# Patient Record
Sex: FEMALE | Race: WHITE | NOT HISPANIC OR LATINO | Employment: OTHER | URBAN - METROPOLITAN AREA
[De-identification: names, ages, dates, MRNs, and addresses within clinical notes are randomized per-mention and may not be internally consistent; named-entity substitution may affect disease eponyms.]

---

## 2017-01-17 ENCOUNTER — COMMUNICATION - HEALTHEAST (OUTPATIENT)
Dept: FAMILY MEDICINE | Facility: CLINIC | Age: 52
End: 2017-01-17

## 2017-01-17 DIAGNOSIS — F51.01 PRIMARY INSOMNIA: ICD-10-CM

## 2017-05-31 ENCOUNTER — COMMUNICATION - HEALTHEAST (OUTPATIENT)
Dept: FAMILY MEDICINE | Facility: CLINIC | Age: 52
End: 2017-05-31

## 2017-05-31 DIAGNOSIS — F51.01 PRIMARY INSOMNIA: ICD-10-CM

## 2017-09-22 ENCOUNTER — COMMUNICATION - HEALTHEAST (OUTPATIENT)
Dept: FAMILY MEDICINE | Facility: CLINIC | Age: 52
End: 2017-09-22

## 2017-09-22 DIAGNOSIS — F51.01 PRIMARY INSOMNIA: ICD-10-CM

## 2017-10-12 ENCOUNTER — AMBULATORY - HEALTHEAST (OUTPATIENT)
Dept: NURSING | Facility: CLINIC | Age: 52
End: 2017-10-12

## 2017-10-12 DIAGNOSIS — Z23 NEED FOR IMMUNIZATION AGAINST INFLUENZA: ICD-10-CM

## 2017-12-19 ENCOUNTER — COMMUNICATION - HEALTHEAST (OUTPATIENT)
Dept: FAMILY MEDICINE | Facility: CLINIC | Age: 52
End: 2017-12-19

## 2017-12-19 DIAGNOSIS — F51.01 PRIMARY INSOMNIA: ICD-10-CM

## 2017-12-20 ENCOUNTER — COMMUNICATION - HEALTHEAST (OUTPATIENT)
Dept: FAMILY MEDICINE | Facility: CLINIC | Age: 52
End: 2017-12-20

## 2017-12-20 DIAGNOSIS — F51.01 PRIMARY INSOMNIA: ICD-10-CM

## 2018-01-12 ENCOUNTER — OFFICE VISIT - HEALTHEAST (OUTPATIENT)
Dept: NURSING | Facility: CLINIC | Age: 53
End: 2018-01-12

## 2018-01-12 ENCOUNTER — OFFICE VISIT - HEALTHEAST (OUTPATIENT)
Dept: FAMILY MEDICINE | Facility: CLINIC | Age: 53
End: 2018-01-12

## 2018-01-12 DIAGNOSIS — Z12.31 VISIT FOR SCREENING MAMMOGRAM: ICD-10-CM

## 2018-01-12 DIAGNOSIS — M54.50 LOW BACK PAIN: ICD-10-CM

## 2018-01-12 DIAGNOSIS — Z78.0 MENOPAUSE: ICD-10-CM

## 2018-01-12 DIAGNOSIS — F51.01 PRIMARY INSOMNIA: ICD-10-CM

## 2018-01-12 DIAGNOSIS — Z00.00 HEALTH CARE MAINTENANCE: ICD-10-CM

## 2018-01-12 LAB
ALBUMIN SERPL-MCNC: 3.9 G/DL (ref 3.5–5)
ALP SERPL-CCNC: 65 U/L (ref 45–120)
ALT SERPL W P-5'-P-CCNC: 28 U/L (ref 0–45)
ANION GAP SERPL CALCULATED.3IONS-SCNC: 9 MMOL/L (ref 5–18)
AST SERPL W P-5'-P-CCNC: 23 U/L (ref 0–40)
BILIRUB SERPL-MCNC: 0.5 MG/DL (ref 0–1)
BUN SERPL-MCNC: 21 MG/DL (ref 8–22)
CALCIUM SERPL-MCNC: 9.6 MG/DL (ref 8.5–10.5)
CHLORIDE BLD-SCNC: 106 MMOL/L (ref 98–107)
CO2 SERPL-SCNC: 28 MMOL/L (ref 22–31)
CREAT SERPL-MCNC: 0.83 MG/DL (ref 0.6–1.1)
ERYTHROCYTE [DISTWIDTH] IN BLOOD BY AUTOMATED COUNT: 11 % (ref 11–14.5)
GFR SERPL CREATININE-BSD FRML MDRD: >60 ML/MIN/1.73M2
GLUCOSE BLD-MCNC: 87 MG/DL (ref 70–125)
HBA1C MFR BLD: 5.2 % (ref 3.5–6.1)
HCT VFR BLD AUTO: 43 % (ref 35–47)
HGB BLD-MCNC: 14.5 G/DL (ref 12–16)
MCH RBC QN AUTO: 31.6 PG (ref 27–34)
MCHC RBC AUTO-ENTMCNC: 33.7 G/DL (ref 32–36)
MCV RBC AUTO: 94 FL (ref 80–100)
PLATELET # BLD AUTO: 305 THOU/UL (ref 140–440)
PMV BLD AUTO: 7.6 FL (ref 7–10)
POTASSIUM BLD-SCNC: 5.3 MMOL/L (ref 3.5–5)
PROT SERPL-MCNC: 6.9 G/DL (ref 6–8)
RBC # BLD AUTO: 4.58 MILL/UL (ref 3.8–5.4)
SODIUM SERPL-SCNC: 143 MMOL/L (ref 136–145)
WBC: 4.6 THOU/UL (ref 4–11)

## 2018-01-12 ASSESSMENT — MIFFLIN-ST. JEOR: SCORE: 1193.57

## 2018-01-15 LAB
25(OH)D3 SERPL-MCNC: 75.7 NG/ML (ref 30–80)
25(OH)D3 SERPL-MCNC: 75.7 NG/ML (ref 30–80)
HPV SOURCE: NORMAL
HUMAN PAPILLOMA VIRUS 16 DNA: NEGATIVE
HUMAN PAPILLOMA VIRUS 18 DNA: NEGATIVE
HUMAN PAPILLOMA VIRUS FINAL DIAGNOSIS: NORMAL
HUMAN PAPILLOMA VIRUS OTHER HR: NEGATIVE
SPECIMEN DESCRIPTION: NORMAL

## 2018-01-20 LAB
BKR LAB AP ABNORMAL BLEEDING: NO
BKR LAB AP BIRTH CONTROL/HORMONES: NORMAL
BKR LAB AP CERVICAL APPEARANCE: NORMAL
BKR LAB AP GYN ADEQUACY: NORMAL
BKR LAB AP GYN INTERPRETATION: NORMAL
BKR LAB AP HPV REFLEX: NORMAL
BKR LAB AP LMP: NORMAL
BKR LAB AP PATIENT STATUS: NORMAL
BKR LAB AP PREVIOUS ABNORMAL: NORMAL
BKR LAB AP PREVIOUS NORMAL: 2014
HIGH RISK?: NO
PATH REPORT.COMMENTS IMP SPEC: NORMAL
RESULT FLAG (HE HISTORICAL CONVERSION): NORMAL

## 2018-01-21 ENCOUNTER — AMBULATORY - HEALTHEAST (OUTPATIENT)
Dept: FAMILY MEDICINE | Facility: CLINIC | Age: 53
End: 2018-01-21

## 2018-01-21 DIAGNOSIS — E87.5 HYPERKALEMIA: ICD-10-CM

## 2018-02-09 ENCOUNTER — HOSPITAL ENCOUNTER (OUTPATIENT)
Dept: MAMMOGRAPHY | Facility: CLINIC | Age: 53
Discharge: HOME | End: 2018-02-09

## 2018-02-09 DIAGNOSIS — Z12.31 VISIT FOR SCREENING MAMMOGRAM: ICD-10-CM

## 2018-03-09 ENCOUNTER — RECORDS - HEALTHEAST (OUTPATIENT)
Dept: ADMINISTRATIVE | Facility: OTHER | Age: 53
End: 2018-03-09

## 2018-04-01 ENCOUNTER — COMMUNICATION - HEALTHEAST (OUTPATIENT)
Dept: FAMILY MEDICINE | Facility: CLINIC | Age: 53
End: 2018-04-01

## 2018-04-01 DIAGNOSIS — F41.9 ANXIETY: ICD-10-CM

## 2018-07-05 ENCOUNTER — COMMUNICATION - HEALTHEAST (OUTPATIENT)
Dept: FAMILY MEDICINE | Facility: CLINIC | Age: 53
End: 2018-07-05

## 2018-07-05 DIAGNOSIS — F51.01 PRIMARY INSOMNIA: ICD-10-CM

## 2018-08-08 ENCOUNTER — COMMUNICATION - HEALTHEAST (OUTPATIENT)
Dept: NURSING | Facility: CLINIC | Age: 53
End: 2018-08-08

## 2018-08-08 DIAGNOSIS — F41.9 ANXIETY: ICD-10-CM

## 2018-09-17 ENCOUNTER — COMMUNICATION - HEALTHEAST (OUTPATIENT)
Dept: FAMILY MEDICINE | Facility: CLINIC | Age: 53
End: 2018-09-17

## 2018-09-17 DIAGNOSIS — F51.01 PRIMARY INSOMNIA: ICD-10-CM

## 2018-12-07 ENCOUNTER — COMMUNICATION - HEALTHEAST (OUTPATIENT)
Dept: FAMILY MEDICINE | Facility: CLINIC | Age: 53
End: 2018-12-07

## 2018-12-07 DIAGNOSIS — F51.01 PRIMARY INSOMNIA: ICD-10-CM

## 2018-12-30 ENCOUNTER — COMMUNICATION - HEALTHEAST (OUTPATIENT)
Dept: FAMILY MEDICINE | Facility: CLINIC | Age: 53
End: 2018-12-30

## 2018-12-30 DIAGNOSIS — F51.01 PRIMARY INSOMNIA: ICD-10-CM

## 2019-04-11 ENCOUNTER — COMMUNICATION - HEALTHEAST (OUTPATIENT)
Dept: FAMILY MEDICINE | Facility: CLINIC | Age: 54
End: 2019-04-11

## 2019-04-11 DIAGNOSIS — F51.01 PRIMARY INSOMNIA: ICD-10-CM

## 2019-04-19 ENCOUNTER — OFFICE VISIT - HEALTHEAST (OUTPATIENT)
Dept: FAMILY MEDICINE | Facility: CLINIC | Age: 54
End: 2019-04-19

## 2019-04-19 DIAGNOSIS — Z12.31 VISIT FOR SCREENING MAMMOGRAM: ICD-10-CM

## 2019-04-19 DIAGNOSIS — F51.01 PRIMARY INSOMNIA: ICD-10-CM

## 2019-04-19 DIAGNOSIS — Z87.898 HX OF MOTION SICKNESS: ICD-10-CM

## 2019-04-19 DIAGNOSIS — F41.8 SITUATIONAL ANXIETY: ICD-10-CM

## 2019-06-01 ENCOUNTER — COMMUNICATION - HEALTHEAST (OUTPATIENT)
Dept: FAMILY MEDICINE | Facility: CLINIC | Age: 54
End: 2019-06-01

## 2019-06-01 DIAGNOSIS — F51.01 PRIMARY INSOMNIA: ICD-10-CM

## 2019-07-18 ENCOUNTER — COMMUNICATION - HEALTHEAST (OUTPATIENT)
Dept: FAMILY MEDICINE | Facility: CLINIC | Age: 54
End: 2019-07-18

## 2019-07-18 DIAGNOSIS — F51.01 PRIMARY INSOMNIA: ICD-10-CM

## 2019-11-27 ENCOUNTER — COMMUNICATION - HEALTHEAST (OUTPATIENT)
Dept: FAMILY MEDICINE | Facility: CLINIC | Age: 54
End: 2019-11-27

## 2019-11-27 DIAGNOSIS — F51.01 PRIMARY INSOMNIA: ICD-10-CM

## 2019-12-04 ENCOUNTER — OFFICE VISIT - HEALTHEAST (OUTPATIENT)
Dept: FAMILY MEDICINE | Facility: CLINIC | Age: 54
End: 2019-12-04

## 2019-12-04 DIAGNOSIS — N95.1 MENOPAUSAL SYNDROME (HOT FLASHES): ICD-10-CM

## 2019-12-04 DIAGNOSIS — R53.83 FATIGUE, UNSPECIFIED TYPE: ICD-10-CM

## 2019-12-04 DIAGNOSIS — Z13.220 SCREENING CHOLESTEROL LEVEL: ICD-10-CM

## 2019-12-04 DIAGNOSIS — F51.01 PRIMARY INSOMNIA: ICD-10-CM

## 2019-12-04 LAB
T3FREE SERPL-MCNC: 2.8 PG/ML (ref 1.9–3.9)
T4 FREE SERPL-MCNC: 0.9 NG/DL (ref 0.7–1.8)
TSH SERPL DL<=0.005 MIU/L-ACNC: 1.23 UIU/ML (ref 0.3–5)

## 2019-12-04 ASSESSMENT — MIFFLIN-ST. JEOR: SCORE: 1164.09

## 2019-12-05 LAB
25(OH)D3 SERPL-MCNC: 58.2 NG/ML (ref 30–80)
THYROID PEROXIDASE ANTIBODIES - HISTORICAL: <3 IU/ML (ref 0–5.6)

## 2019-12-06 ENCOUNTER — AMBULATORY - HEALTHEAST (OUTPATIENT)
Dept: LAB | Facility: CLINIC | Age: 54
End: 2019-12-06

## 2019-12-06 ENCOUNTER — HOSPITAL ENCOUNTER (OUTPATIENT)
Dept: MAMMOGRAPHY | Facility: CLINIC | Age: 54
Discharge: HOME | End: 2019-12-06
Attending: FAMILY MEDICINE

## 2019-12-06 DIAGNOSIS — Z12.31 VISIT FOR SCREENING MAMMOGRAM: ICD-10-CM

## 2019-12-06 DIAGNOSIS — Z13.220 SCREENING CHOLESTEROL LEVEL: ICD-10-CM

## 2019-12-06 DIAGNOSIS — R53.83 FATIGUE, UNSPECIFIED TYPE: ICD-10-CM

## 2019-12-06 LAB
ALBUMIN SERPL-MCNC: 4 G/DL (ref 3.5–5)
ALP SERPL-CCNC: 58 U/L (ref 45–120)
ALT SERPL W P-5'-P-CCNC: 16 U/L (ref 0–45)
ANION GAP SERPL CALCULATED.3IONS-SCNC: 7 MMOL/L (ref 5–18)
AST SERPL W P-5'-P-CCNC: 21 U/L (ref 0–40)
BILIRUB SERPL-MCNC: 0.4 MG/DL (ref 0–1)
BUN SERPL-MCNC: 20 MG/DL (ref 8–22)
CALCIUM SERPL-MCNC: 9.6 MG/DL (ref 8.5–10.5)
CHLORIDE BLD-SCNC: 105 MMOL/L (ref 98–107)
CHOLEST SERPL-MCNC: 205 MG/DL
CO2 SERPL-SCNC: 31 MMOL/L (ref 22–31)
CREAT SERPL-MCNC: 0.87 MG/DL (ref 0.6–1.1)
ERYTHROCYTE [DISTWIDTH] IN BLOOD BY AUTOMATED COUNT: 10.9 % (ref 11–14.5)
ESTRADIOL SERPL-MCNC: <10 PG/ML
FASTING STATUS PATIENT QL REPORTED: YES
FSH SERPL-ACNC: 109.4 MIU/ML
GFR SERPL CREATININE-BSD FRML MDRD: >60 ML/MIN/1.73M2
GLUCOSE BLD-MCNC: 95 MG/DL (ref 70–125)
HCT VFR BLD AUTO: 43.6 % (ref 35–47)
HDLC SERPL-MCNC: 72 MG/DL
HGB BLD-MCNC: 14.6 G/DL (ref 12–16)
LDLC SERPL CALC-MCNC: 118 MG/DL
LH SERPL-ACNC: 51.7 MIU/ML
MCH RBC QN AUTO: 32 PG (ref 27–34)
MCHC RBC AUTO-ENTMCNC: 33.5 G/DL (ref 32–36)
MCV RBC AUTO: 96 FL (ref 80–100)
PLATELET # BLD AUTO: 248 THOU/UL (ref 140–440)
PMV BLD AUTO: 7.7 FL (ref 7–10)
POTASSIUM BLD-SCNC: 4.9 MMOL/L (ref 3.5–5)
PROGEST SERPL-MCNC: <0.1 NG/ML
PROT SERPL-MCNC: 6.3 G/DL (ref 6–8)
RBC # BLD AUTO: 4.56 MILL/UL (ref 3.8–5.4)
SODIUM SERPL-SCNC: 143 MMOL/L (ref 136–145)
TRIGL SERPL-MCNC: 76 MG/DL
WBC: 3.8 THOU/UL (ref 4–11)

## 2019-12-07 LAB — DHEA-S SERPL-MCNC: 60 UG/DL (ref 26–200)

## 2019-12-09 ENCOUNTER — COMMUNICATION - HEALTHEAST (OUTPATIENT)
Dept: FAMILY MEDICINE | Facility: CLINIC | Age: 54
End: 2019-12-09

## 2019-12-10 LAB
SHBG SERPL-SCNC: 65 NMOL/L (ref 30–135)
TESTOST FREE SERPL-MCNC: 0.07 NG/DL (ref 0.11–0.58)
TESTOST SERPL-MCNC: 7 NG/DL (ref 8–60)

## 2019-12-18 ENCOUNTER — OFFICE VISIT - HEALTHEAST (OUTPATIENT)
Dept: FAMILY MEDICINE | Facility: CLINIC | Age: 54
End: 2019-12-18

## 2019-12-18 DIAGNOSIS — R53.82 CHRONIC FATIGUE: ICD-10-CM

## 2019-12-18 DIAGNOSIS — R79.89 LOW SERUM PROGESTERONE: ICD-10-CM

## 2019-12-18 ASSESSMENT — MIFFLIN-ST. JEOR: SCORE: 1163.8

## 2020-01-15 ENCOUNTER — COMMUNICATION - HEALTHEAST (OUTPATIENT)
Dept: FAMILY MEDICINE | Facility: CLINIC | Age: 55
End: 2020-01-15

## 2020-01-15 DIAGNOSIS — R79.89 LOW SERUM PROGESTERONE: ICD-10-CM

## 2020-04-16 ENCOUNTER — COMMUNICATION - HEALTHEAST (OUTPATIENT)
Dept: FAMILY MEDICINE | Facility: CLINIC | Age: 55
End: 2020-04-16

## 2020-04-16 DIAGNOSIS — R79.89 LOW SERUM PROGESTERONE: ICD-10-CM

## 2020-06-10 ENCOUNTER — OFFICE VISIT - HEALTHEAST (OUTPATIENT)
Dept: FAMILY MEDICINE | Facility: CLINIC | Age: 55
End: 2020-06-10

## 2020-06-10 ENCOUNTER — RECORDS - HEALTHEAST (OUTPATIENT)
Dept: GENERAL RADIOLOGY | Facility: CLINIC | Age: 55
End: 2020-06-10

## 2020-06-10 DIAGNOSIS — S99.912A ANKLE INJURY, LEFT, INITIAL ENCOUNTER: ICD-10-CM

## 2020-06-10 DIAGNOSIS — S99.912A UNSPECIFIED INJURY OF LEFT ANKLE, INITIAL ENCOUNTER: ICD-10-CM

## 2020-06-10 DIAGNOSIS — R53.82 CHRONIC FATIGUE: ICD-10-CM

## 2020-06-10 DIAGNOSIS — Z79.890 POSTMENOPAUSAL HORMONE REPLACEMENT THERAPY: ICD-10-CM

## 2020-06-29 ENCOUNTER — COMMUNICATION - HEALTHEAST (OUTPATIENT)
Dept: FAMILY MEDICINE | Facility: CLINIC | Age: 55
End: 2020-06-29

## 2020-07-13 ENCOUNTER — COMMUNICATION - HEALTHEAST (OUTPATIENT)
Dept: FAMILY MEDICINE | Facility: CLINIC | Age: 55
End: 2020-07-13

## 2020-07-27 ENCOUNTER — COMMUNICATION - HEALTHEAST (OUTPATIENT)
Dept: FAMILY MEDICINE | Facility: CLINIC | Age: 55
End: 2020-07-27

## 2020-08-18 ENCOUNTER — COMMUNICATION - HEALTHEAST (OUTPATIENT)
Dept: FAMILY MEDICINE | Facility: CLINIC | Age: 55
End: 2020-08-18

## 2020-08-31 ENCOUNTER — COMMUNICATION - HEALTHEAST (OUTPATIENT)
Dept: FAMILY MEDICINE | Facility: CLINIC | Age: 55
End: 2020-08-31

## 2020-08-31 DIAGNOSIS — Z79.890 POSTMENOPAUSAL HORMONE REPLACEMENT THERAPY: ICD-10-CM

## 2020-09-01 ENCOUNTER — COMMUNICATION - HEALTHEAST (OUTPATIENT)
Dept: FAMILY MEDICINE | Facility: CLINIC | Age: 55
End: 2020-09-01

## 2020-09-01 DIAGNOSIS — Z79.890 POSTMENOPAUSAL HORMONE REPLACEMENT THERAPY: ICD-10-CM

## 2020-10-12 ENCOUNTER — COMMUNICATION - HEALTHEAST (OUTPATIENT)
Dept: FAMILY MEDICINE | Facility: CLINIC | Age: 55
End: 2020-10-12

## 2020-10-12 DIAGNOSIS — L98.9 SKIN LESION: ICD-10-CM

## 2020-11-07 ENCOUNTER — COMMUNICATION - HEALTHEAST (OUTPATIENT)
Dept: FAMILY MEDICINE | Facility: CLINIC | Age: 55
End: 2020-11-07

## 2020-12-01 ENCOUNTER — RECORDS - HEALTHEAST (OUTPATIENT)
Dept: ADMINISTRATIVE | Facility: OTHER | Age: 55
End: 2020-12-01

## 2020-12-10 ENCOUNTER — COMMUNICATION - HEALTHEAST (OUTPATIENT)
Dept: FAMILY MEDICINE | Facility: CLINIC | Age: 55
End: 2020-12-10

## 2020-12-10 DIAGNOSIS — Z79.890 POSTMENOPAUSAL HORMONE REPLACEMENT THERAPY: ICD-10-CM

## 2020-12-10 DIAGNOSIS — F51.01 PRIMARY INSOMNIA: ICD-10-CM

## 2020-12-21 ENCOUNTER — OFFICE VISIT - HEALTHEAST (OUTPATIENT)
Dept: FAMILY MEDICINE | Facility: CLINIC | Age: 55
End: 2020-12-21

## 2020-12-21 DIAGNOSIS — N95.1 MENOPAUSAL SYNDROME (HOT FLASHES): ICD-10-CM

## 2020-12-21 DIAGNOSIS — Z79.890 POSTMENOPAUSAL HORMONE REPLACEMENT THERAPY: ICD-10-CM

## 2020-12-21 DIAGNOSIS — F51.01 PRIMARY INSOMNIA: ICD-10-CM

## 2020-12-21 RX ORDER — PAROXETINE 20 MG/1
TABLET, FILM COATED ORAL
Qty: 90 TABLET | Refills: 3 | Status: SHIPPED | OUTPATIENT
Start: 2020-12-21 | End: 2022-01-21

## 2020-12-21 RX ORDER — ESTRADIOL 1 MG/1
1 TABLET ORAL DAILY
Qty: 90 TABLET | Refills: 3 | Status: SHIPPED | OUTPATIENT
Start: 2020-12-21 | End: 2022-01-17

## 2020-12-21 RX ORDER — GABAPENTIN 300 MG/1
300 CAPSULE ORAL AT BEDTIME
Qty: 90 CAPSULE | Refills: 3 | Status: SHIPPED | OUTPATIENT
Start: 2020-12-21 | End: 2022-09-13

## 2020-12-21 RX ORDER — TRAZODONE HYDROCHLORIDE 100 MG/1
TABLET ORAL
Qty: 90 TABLET | Refills: 3 | Status: SHIPPED | OUTPATIENT
Start: 2020-12-21 | End: 2022-01-17

## 2020-12-28 ENCOUNTER — RECORDS - HEALTHEAST (OUTPATIENT)
Dept: ADMINISTRATIVE | Facility: OTHER | Age: 55
End: 2020-12-28

## 2021-05-25 ENCOUNTER — RECORDS - HEALTHEAST (OUTPATIENT)
Dept: ADMINISTRATIVE | Facility: CLINIC | Age: 56
End: 2021-05-25

## 2021-05-26 ENCOUNTER — RECORDS - HEALTHEAST (OUTPATIENT)
Dept: ADMINISTRATIVE | Facility: CLINIC | Age: 56
End: 2021-05-26

## 2021-05-27 ENCOUNTER — RECORDS - HEALTHEAST (OUTPATIENT)
Dept: ADMINISTRATIVE | Facility: CLINIC | Age: 56
End: 2021-05-27

## 2021-05-27 NOTE — TELEPHONE ENCOUNTER
Called pt and informed of message. Pt would like to Est Care with another provider but is not sure who and states now is not a good time.  She will look into this more and call back to schedule. She is aware that we would not be able to refill script for her until she is seen.

## 2021-05-27 NOTE — TELEPHONE ENCOUNTER
RN cannot approve Refill Request    RN can NOT refill this medication PCP messaged that patient is overdue for Office Visit.     Last office visit: 9/2/2016 Sonya Richard NP Last Physical: 1/12/2018 Last MTM visit: Visit date not found Last visit same specialty: 9/2/2016 Sonya Richard NP.  Next visit within 3 mo: Visit date not found  Next physical within 3 mo: Visit date not found      Xiomara Cardenas, [facility] Triage/Med Refill 4/11/2019    Requested Prescriptions   Pending Prescriptions Disp Refills     traZODone (DESYREL) 150 MG tablet [Pharmacy Med Name: TRAZODONE 150 MG TABLET] 90 tablet 1     Sig: TAKE 1 TABLET BY MOUTH AT BEDTIME WITH 50 MG TABLET FOR A TOTAL  MG.       Tricyclics/Misc Antidepressant/Antianxiety Meds Refill Protocol Failed - 4/11/2019  1:16 AM        Failed - PCP or prescribing provider visit in last year     Last office visit with prescriber/PCP: 9/2/2016 Sonya Richard NP OR same dept: Visit date not found OR same specialty: 9/2/2016 Sonya Richard NP  Last physical: 1/12/2018 Last MTM visit: Visit date not found   Next visit within 3 mo: Visit date not found  Next physical within 3 mo: Visit date not found  Prescriber OR PCP: Sonya Richard NP  Last diagnosis associated with med order: 1. Primary insomnia  - traZODone (DESYREL) 150 MG tablet [Pharmacy Med Name: TRAZODONE 150 MG TABLET]; TAKE 1 TABLET BY MOUTH AT BEDTIME WITH 50 MG TABLET FOR A TOTAL  MG.  Dispense: 90 tablet; Refill: 1    If protocol passes may refill for 12 months if within 3 months of last provider visit (or a total of 15 months).
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
## 2021-05-29 NOTE — TELEPHONE ENCOUNTER
Former patient of Sonya Richard NP & has not established care with another provider.  Please assign refill request to covering provider per Clinic standard process.    Refill Approved    Rx renewed per Medication Renewal Policy. Medication was last renewed on 12/7/2018 with 1 refill.  Last office visit:     Meredith Frias, [facility] Triage/Med Refill 6/2/2019     Requested Prescriptions   Pending Prescriptions Disp Refills     PARoxetine (PAXIL) 20 MG tablet [Pharmacy Med Name: PAROXETINE HCL 20 MG TABLET] 90 tablet 1     Sig: TAKE 1 TABLET BY MOUTH EVERY DAY IN THE MORNING       [facility] Refill Protocol  Passed - 6/1/2019 12:15 AM        Passed - PCP or prescribing provider visit in last year     Last office visit with prescriber/PCP: 9/2/2016 Sonya Richard NP OR same dept: 4/19/2019 Julianne Tyler MD OR same specialty: 4/19/2019 Julianne Tyler MD  Last physical: 1/12/2018 Last MTM visit: Visit date not found   Next visit within 3 mo: Visit date not found  Next physical within 3 mo: Visit date not found  Prescriber OR PCP: Sonya Richard NP  Last diagnosis associated with med order: 1. Primary insomnia  - PARoxetine (PAXIL) 20 MG tablet [Pharmacy Med Name: PAROXETINE HCL 20 MG TABLET]; TAKE 1 TABLET BY MOUTH EVERY DAY IN THE MORNING  Dispense: 90 tablet; Refill: 1    If protocol passes may refill for 12 months if within 3 months of last provider visit (or a total of 15 months).

## 2021-05-30 NOTE — TELEPHONE ENCOUNTER
Former patient of Jayson & has not established care with another provider.  Please assign refill request to covering provider per Clinic standard process.    Refill Approved    Rx renewed per Medication Renewal Policy. Medication was last renewed on 4/19/19.    Fidelia Atkinson, [facility] Triage/Med Refill 7/18/2019     Requested Prescriptions   Pending Prescriptions Disp Refills     traZODone (DESYREL) 150 MG tablet [Pharmacy Med Name: TRAZODONE 150 MG TABLET] 90 tablet 0     Sig: TAKE 1 TABLET BY MOUTH EVERY DAY       Tricyclics/Misc Antidepressant/Antianxiety Meds Refill Protocol Passed - 7/18/2019  1:19 AM        Passed - PCP or prescribing provider visit in last year     Last office visit with prescriber/PCP: 4/19/2019 Julianne Tyler MD OR same dept: 4/19/2019 Julianne Tyler MD OR same specialty: 4/19/2019 Julianne Tyler MD  Last physical: Visit date not found Last MTM visit: Visit date not found   Next visit within 3 mo: Visit date not found  Next physical within 3 mo: Visit date not found  Prescriber OR PCP: Julianne yTler MD  Last diagnosis associated with med order: 1. Primary insomnia  - traZODone (DESYREL) 150 MG tablet [Pharmacy Med Name: TRAZODONE 150 MG TABLET]; TAKE 1 TABLET BY MOUTH EVERY DAY  Dispense: 90 tablet; Refill: 0    If protocol passes may refill for 12 months if within 3 months of last provider visit (or a total of 15 months).

## 2021-05-30 NOTE — TELEPHONE ENCOUNTER
Refill request for medication: 7/18/19  Last visit addressing this medication: 4/19/19  Follow up plan Follow up: for annual exam or to establish care for ongoing rx management    Last refill on 4/19/19, quantity #90   CSA completed not done at time of last visit    checked  07/18/19, last dispensed refill 4/19/19    Appointment: Patient will call back to schedule appointment - pt wondering if she can have Dr. Tyler listed as PCP as she felt she was a great fit after 4/19/19 appointment. - wondering if she still needs an  Establish care visit.     Also pt has weaned herself down to 100mg and is requesting 50mg tablets so she can keep decreasing dose.    Minerva Cheney, MARBINA

## 2021-05-31 VITALS — BODY MASS INDEX: 21.59 KG/M2 | WEIGHT: 129.56 LBS | HEIGHT: 65 IN

## 2021-06-03 VITALS — WEIGHT: 115.4 LBS | BODY MASS INDEX: 19.2 KG/M2

## 2021-06-03 NOTE — TELEPHONE ENCOUNTER
Last seen as refilled by Dr. Tyler.    Refill request for medication: PARoxetine (PAXIL) 20 MG tablet  Last visit addressing this medication: 4/19/19  Follow up plan Follow up: for annual exam or to establish care for ongoing rx management    Last refill on 6/3/19, quantity #90     Appointment: Appointment scheduled for 12/4/19 med check with Dr. Carmen Thompson MA

## 2021-06-03 NOTE — TELEPHONE ENCOUNTER
Former patient of Sonya Richard & has not established care with another provider.  Please assign refill request to covering provider per Clinic standard process.    Dr Tyler did last OV and refill on 4/19/2019  Emily Bansal RN, BAN, Nurse Advisor, [place] 11p-7a

## 2021-06-04 VITALS
WEIGHT: 123.06 LBS | DIASTOLIC BLOOD PRESSURE: 60 MMHG | HEIGHT: 65 IN | BODY MASS INDEX: 20.5 KG/M2 | SYSTOLIC BLOOD PRESSURE: 100 MMHG | HEART RATE: 60 BPM

## 2021-06-04 VITALS
HEIGHT: 65 IN | BODY MASS INDEX: 20.49 KG/M2 | HEART RATE: 60 BPM | WEIGHT: 123 LBS | SYSTOLIC BLOOD PRESSURE: 100 MMHG | DIASTOLIC BLOOD PRESSURE: 68 MMHG

## 2021-06-04 VITALS
HEART RATE: 71 BPM | SYSTOLIC BLOOD PRESSURE: 106 MMHG | WEIGHT: 118.13 LBS | BODY MASS INDEX: 19.66 KG/M2 | DIASTOLIC BLOOD PRESSURE: 55 MMHG

## 2021-06-04 NOTE — PROGRESS NOTES
ASSESSMENT/PLAN:  This is a pleasant 54-year-old postmenopausal female with chronic fatigue, low libido, insomnia, anxiety, hot flashes presented today to discuss her recent lab results for the above-mentioned symptoms.  Her lab results showed low testosterone, estrogen, and progesterone.  We had a lengthy discussion regarding optimization of her hormones especially that of bioidentical hormones.  We also spoke about optimal nutrition, physical activity, and self-care.  We will start her on progesterone which I believe will help with sleep whereby it may be synergistic to trazodone and even potentially wean herself off trazodone.  Natural progesterone also protects against uterine and breast carcinoma, osteoporosis, fibrocystic disease, ovarian cyst, and coronary artery disease.  We will trial her on bioidentical progesterone first then consider bioidentical testosterone and estrogen.  Benefits and side effects reviewed with the patient.  I recommend a repeat hormone lab levels in 1 to 3 months.  She verbalized understanding and agreed with the plan    Chronic fatigue, low libido, insomnia, anxiety, hot flashes  Low serum progesterone  -     progesterone (PROMETRIUM) 100 MG capsule; Take 1 capsule (100 mg total) by mouth daily.  Low testosterone  Low estrogen    CHIEF COMPLAINT:  Chief Complaint   Patient presents with     discuss lab results     12/6/19     HISTORY OF PRESENT ILLNESS:  Soraida is a 54 y.o. female presenting to the clinic today to discuss her lab results from December 6th regarding fatigue. She presented to the clinic on 12/04 with complaints of fatigue. She returned to the clinic on 12/06 for fasting blood work. Today, she mentioned that she gets headaches very easily after eating sugar. She will also get lower back pain and wrist pain along with the headaches. She has never had any food sensitivities in the past. The paxil has been able to relieve her night sweats/hot flashes. She has noticed that  "her sexual drive has been lower than normal lately. She notes that she had a cold intermittently for the last month. Her last period was about a year and a half ago. She inquires about her lab results from the 6th. She request to walk through the labs and their meaning.      REVIEW OF SYSTEMS:   Constitutional: Negative.   HENT: Negative.   Eyes: Negative.   Respiratory: Negative.   Cardiovascular: Negative.   Gastrointestinal: Negative.   Endocrine: Negative.   Genitourinary: Negative.   Musculoskeletal: Negative.   Skin: Negative.   Allergic/Immunologic: Negative.   Neurological: Negative.   Hematological: Negative.   Psychiatric/Behavioral: Negative.   All other systems are negative.    PFSH:  She has a family history of cholesterol. She has four children.     TOBACCO USE:  Social History     Tobacco Use   Smoking Status Never Smoker   Smokeless Tobacco Never Used       VITALS:  Vitals:    12/18/19 1428   BP: 100/68   Patient Site: Right Arm   Patient Position: Sitting   Cuff Size: Adult Regular   Pulse: 60   Weight: 123 lb (55.8 kg)   Height: 5' 5\" (1.651 m)     Wt Readings from Last 3 Encounters:   12/18/19 123 lb (55.8 kg)   12/04/19 123 lb 1 oz (55.8 kg)   04/19/19 115 lb 6.4 oz (52.3 kg)       PHYSICAL EXAM:  Constitutional: Patient is oriented to person, place, and time. Patient appears well-developed and well-nourished. No distress.   Head: Normocephalic and atraumatic.   Right Ear: External ear normal.   Left Ear: External ear normal.   Nose: Nose normal.   Eyes: Conjunctivae and EOM are normal. Right eye exhibits no discharge. Left eye exhibits no discharge. No scleral icterus.   Neurological: Patient is alert and oriented to person, place, and time. No cranial nerve deficit. Coordination normal.   Skin: No rash noted. Patient is not diaphoretic. No erythema. No pallor.    ADDITIONAL HISTORY SUMMARIZED (2): None.  DECISION TO OBTAIN EXTRA INFORMATION (1): None.   RADIOLOGY TESTS (1): None.  LABS (1): " Reviewed labs from 12/06/19.   MEDICINE TESTS (1): None.  INDEPENDENT REVIEW (2 each): None.     The visit lasted a total of 27 minutes face to face with the patient. Over 50% of the time was spent counseling and educating the patient about hormone levels and fatigue.    IKathryn, am scribing for and in the presence of, Dr. Morales.    I, Dr. Morales, personally performed the services described in this documentation, as scribed by Kathryn Juarez in my presence, and it is both accurate and complete.    MEDICATIONS:  Current Outpatient Medications   Medication Sig Dispense Refill     PARoxetine (PAXIL) 20 MG tablet TAKE 1 TABLET BY MOUTH EVERY DAY IN THE MORNING 90 tablet 3     traZODone (DESYREL) 100 MG tablet TAKE 1 TABLET BY MOUTH EVERY DAY 90 tablet 3     progesterone (PROMETRIUM) 100 MG capsule Take 1 capsule (100 mg total) by mouth daily. 30 capsule 0     No current facility-administered medications for this visit.        Total data points:1

## 2021-06-04 NOTE — PROGRESS NOTES
ASSESSMENT/PLAN:  Menopausal syndrome (hot flashes)  refilled  -     PARoxetine (PAXIL) 20 MG tablet; TAKE 1 TABLET BY MOUTH EVERY DAY IN THE MORNING    Primary insomnia  refilled  -     traZODone (DESYREL) 100 MG tablet; TAKE 1 TABLET BY MOUTH EVERY DAY    Fatigue, unspecified type  Described as low energy  Sleeping well on trazodone 100mg  paxil for hot flashes  No depression/anxiety  -     Vitamin D, Total (25-Hydroxy)  -     HM2(CBC w/o Differential)  -     Dehydroepiandrosterone Sulfate, Serum (DHEAS); Future  -     Estradiol; Future  -     Luteinizing Hormone (LH); Future  -     Progesterone; Future  -     Follicle Stimulating Hormone (FSH); Future  -     Testosterone, Free and Total (BTEST); Future  -     Comprehensive Metabolic Panel; Future  -     Thyroid Stimulating Hormone (TSH)  -     T4, Free  -     T3 (Triiodothyronine), Free  -     Thyroid Peroxidase Antibody    Screening cholesterol level  -     Lipid Cascade; Future    Orders Placed This Encounter   Procedures     Vitamin D, Total (25-Hydroxy)     HM2(CBC w/o Differential)     Dehydroepiandrosterone Sulfate, Serum (DHEAS)     Standing Status:   Future     Standing Expiration Date:   12/4/2020     Estradiol     Standing Status:   Future     Standing Expiration Date:   12/4/2020     Luteinizing Hormone (LH)     Standing Status:   Future     Standing Expiration Date:   12/4/2020     Progesterone     Standing Status:   Future     Standing Expiration Date:   12/4/2020     Follicle Stimulating Hormone (FSH)     Standing Status:   Future     Standing Expiration Date:   12/4/2020     Testosterone, Free and Total (BTEST)     Standing Status:   Future     Standing Expiration Date:   12/4/2020     Lipid Cascade     Standing Status:   Future     Standing Expiration Date:   12/4/2020     Order Specific Question:   Fasting is required?     Answer:   Yes     Comprehensive Metabolic Panel     Standing Status:   Future     Standing Expiration Date:   12/4/2020      "Thyroid Stimulating Hormone (TSH)     T4, Free     T3 (Triiodothyronine), Free     Thyroid Peroxidase Antibody     CHIEF COMPLAINT:  Chief Complaint   Patient presents with     med check     Medication Refill     low energy     wants to check thyroid and Vit D       HISTORY OF PRESENT ILLNESS:  Soraida is a 53 y.o. female presenting to the clinic today for an insomnia and night sweat follow up along with low energy.    Excessive Sweating: She has been taking paroxetine 20 mg for at least 3 years for night sweats. The night sweats began in her early 40's. It has been working well for her. She was not taking it for depression or anxiety.     Insomnia: She has been taking trazodone 100 mg once at bedtime for her insomnia. The insomnia is well managed by the medication. She is not concerned about her insomnia today.     Low Energy: For the last 6 months, she has felt very low energy. She is not tired and thinks that she sleeps well with the trazodone. However, she still feels a little \"blah.\" She does not feel depressed. She has been post-menopausal for about a year. Her  does not complain that she snores or stops breathing. She has been taking a [id number] units of vitamin D once a day. She does not have a history of low vitamin D.     REVIEW OF SYSTEMS:   Constitutional: Negative.   HENT: Negative.   Eyes: Negative.   Respiratory: Negative.   Cardiovascular: Negative.   Gastrointestinal: Negative.   Endocrine: Negative.   Genitourinary: Negative.   Musculoskeletal: Negative.   Skin: Negative.   Allergic/Immunologic: Negative.   Neurological: Negative.   Hematological: Negative.   Psychiatric/Behavioral: Negative.   All other systems are negative.    PFSH:  She has four children. She had them each 3 years a part. Her oldest is 26 years old and the youngest is 16 years old. Her maternal side of the family all has diabetes. She did not have gestational diabetes. Her daughter had non-cancerous nodules over her " "thyroid. Her 's family has thyroid problems.     TOBACCO USE:  Social History     Tobacco Use   Smoking Status Never Smoker   Smokeless Tobacco Never Used       VITALS:  Vitals:    12/04/19 1319   BP: 100/60   Patient Site: Left Arm   Patient Position: Sitting   Cuff Size: Adult Regular   Pulse: 60   Weight: 123 lb 1 oz (55.8 kg)   Height: 5' 5\" (1.651 m)     Wt Readings from Last 3 Encounters:   12/04/19 123 lb 1 oz (55.8 kg)   04/19/19 115 lb 6.4 oz (52.3 kg)   01/12/18 129 lb 9 oz (58.8 kg)       PHYSICAL EXAM:  Constitutional: Patient is oriented to person, place, and time. Patient appears well-developed and well-nourished. No distress.   Head: Normocephalic and atraumatic.   Right Ear: External ear normal.   Left Ear: External ear normal.   Nose: Nose normal.   Eyes: Conjunctivae and EOM are normal. Right eye exhibits no discharge. Left eye exhibits no discharge. No scleral icterus.   Neurological: Patient is alert and oriented to person, place, and time. No cranial nerve deficit. Coordination normal.   Skin: No rash noted. Patient is not diaphoretic. No erythema. No pallor.    ADDITIONAL HISTORY SUMMARIZED (2): None.  DECISION TO OBTAIN EXTRA INFORMATION (1): None.   RADIOLOGY TESTS (1): None.  LABS (1): Reviewed labs from 1/12/18 and ordered labs today.   MEDICINE TESTS (1): None.  INDEPENDENT REVIEW (2 each): None.     IKathryn, am scribing for and in the presence of, Dr. Morales.    IDr. Morales, personally performed the services described in this documentation, as scribed by Kathryn Juarez in my presence, and it is both accurate and complete.    MEDICATIONS:  Current Outpatient Medications   Medication Sig Dispense Refill     PARoxetine (PAXIL) 20 MG tablet TAKE 1 TABLET BY MOUTH EVERY DAY IN THE MORNING 90 tablet 3     traZODone (DESYREL) 100 MG tablet TAKE 1 TABLET BY MOUTH EVERY DAY 90 tablet 3     No current facility-administered medications for this visit.        Total data " points:1

## 2021-06-05 NOTE — TELEPHONE ENCOUNTER
Refill Approved    Rx renewed per Medication Renewal Policy. Medication was last renewed on 12/18/19.    Fidelia Atkinson, [facility] Triage/Med Refill 1/15/2020     Requested Prescriptions   Pending Prescriptions Disp Refills     progesterone (PROMETRIUM) 100 MG capsule 90 capsule 3     Sig: Take 1 capsule (100 mg total) by mouth daily.       Hormone Replacement Therapy Refill Protocol Passed - 1/15/2020  1:00 PM        Passed - PCP or prescribing provider visit in past 12 months       Last office visit with prescriber/PCP: 12/18/2019 Lorenzo Bello MD OR same dept: 12/18/2019 Lorenzo Bello MD OR same specialty: 12/18/2019 Lorenzo Bello MD  Last physical: Visit date not found Last MTM visit: Visit date not found     Next visit within 3 mo: Visit date not found  Next physical within 3 mo: Visit date not found  Prescriber OR PCP: Lorenzo Díaz MD  Last diagnosis associated with med order: 1. Low serum progesterone  - progesterone (PROMETRIUM) 100 MG capsule; Take 1 capsule (100 mg total) by mouth daily.  Dispense: 90 capsule; Refill: 3     If protocol passes may refill for 3 months.

## 2021-06-05 NOTE — TELEPHONE ENCOUNTER
FYI - Status Update    Who is Calling:   Patient     Update:  patient calling with update on postmenopausal symptoms.    States the medication progesterone (PROMETRIUM) 100 MG capsule is helping a lot and question if PCP has any other plans in the works or continue current treatment plan?    Okay to leave a detailed message?:  Yes

## 2021-06-07 NOTE — TELEPHONE ENCOUNTER
Refill Approved    Rx renewed per Medication Renewal Policy. Medication was last renewed on 1/15/20.    Fidelia Atkinson, [facility] Triage/Med Refill 4/16/2020     Requested Prescriptions   Pending Prescriptions Disp Refills     progesterone (PROMETRIUM) 100 MG capsule [Pharmacy Med Name: PROGESTERONE 100 MG CAPSULE] 90 capsule 0     Sig: TAKE 1 CAPSULE BY MOUTH EVERY DAY       Hormone Replacement Therapy Refill Protocol Passed - 4/16/2020  4:31 PM        Passed - PCP or prescribing provider visit in past 12 months       Last office visit with prescriber/PCP: 12/18/2019 Lorenzo Bello MD OR same dept: 12/18/2019 Lorenzo Bello MD OR same specialty: 12/18/2019 Lorenzo Bello MD  Last physical: Visit date not found Last MTM visit: Visit date not found     Next visit within 3 mo: Visit date not found  Next physical within 3 mo: Visit date not found  Prescriber OR PCP: Lorenzo Díaz MD  Last diagnosis associated with med order: 1. Low serum progesterone  - progesterone (PROMETRIUM) 100 MG capsule [Pharmacy Med Name: PROGESTERONE 100 MG CAPSULE]; TAKE 1 CAPSULE BY MOUTH EVERY DAY  Dispense: 90 capsule; Refill: 0     If protocol passes may refill for 3 months.

## 2021-06-07 NOTE — TELEPHONE ENCOUNTER
Refill Request  Did you contact pharmacy: Yes  Medication name:   Requested Prescriptions     Pending Prescriptions Disp Refills     progesterone (PROMETRIUM) 100 MG capsule [Pharmacy Med Name: PROGESTERONE 100 MG CAPSULE] 90 capsule 0     Sig: TAKE 1 CAPSULE BY MOUTH EVERY DAY     Who prescribed the medication: Lorenzo Bello MD  Requested Pharmacy: [facility]  Is patient out of medication: No.  3 days left  Patient notified refills processed in 3 business days:  yes  Okay to leave a detailed message: no
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
## 2021-06-09 NOTE — TELEPHONE ENCOUNTER
I don't have MRI result that orthopedic ordered. Please inform the patient  As for her hormones, please see me in 3 months

## 2021-06-09 NOTE — TELEPHONE ENCOUNTER
Spoke to patient.  Vaginal spotting can be a side effect of estradiol.  Keep up with all her meds.  If still with spotting in 2 wks then call & will consider vikas

## 2021-06-10 NOTE — TELEPHONE ENCOUNTER
Spotting for 5-6 wks since starting estradiol.  Also with bloatiness and HA  Plan: stop estradiol  Everything else stays the same  Update in 2 wks

## 2021-06-11 NOTE — TELEPHONE ENCOUNTER
Refill Approved    Rx renewed per Medication Renewal Policy. Medication was last renewed on 6/10/2020.    Linda Villalobos, [facility] Triage/Med Refill 9/2/2020     Requested Prescriptions   Pending Prescriptions Disp Refills     progesterone (PROMETRIUM) 200 MG capsule [Pharmacy Med Name: PROGESTERONE 200 MG CAPSULE] 90 capsule 0     Sig: TAKE 1 CAPSULE (200 MG TOTAL) BY MOUTH AT BEDTIME.       Hormone Replacement Therapy Refill Protocol Passed - 8/31/2020 12:16 AM        Passed - PCP or prescribing provider visit in past 12 months       Last office visit with prescriber/PCP: 6/10/2020 Lorenzo Bello MD OR same dept: 6/10/2020 Lorenzo Bello MD OR same specialty: 6/10/2020 Lorenzo Bello MD  Last physical: Visit date not found Last MTM visit: Visit date not found     Next visit within 3 mo: Visit date not found  Next physical within 3 mo: Visit date not found  Prescriber OR PCP: Lorenzo Díaz MD  Last diagnosis associated with med order: 1. Postmenopausal hormone replacement therapy  - progesterone (PROMETRIUM) 200 MG capsule [Pharmacy Med Name: PROGESTERONE 200 MG CAPSULE]; Take 1 capsule (200 mg total) by mouth at bedtime.  Dispense: 90 capsule; Refill: 0     If protocol passes may refill for 3 months.

## 2021-06-13 NOTE — PROGRESS NOTES
"Soraida Perkins is a 55 y.o. female who is being evaluated via a billable video visit.      The patient has been notified of following:     \"This video visit will be conducted via a call between you and your physician/provider. We have found that certain health care needs can be provided without the need for an in-person physical exam.  This service lets us provide the care you need with a video conversation.  If a prescription is necessary we can send it directly to your pharmacy.  If lab work is needed we can place an order for that and you can then stop by our lab to have the test done at a later time.    Video visits are billed at different rates depending on your insurance coverage. Please reach out to your insurance provider with any questions.    If during the course of the call the physician/provider feels a video visit is not appropriate, you will not be charged for this service.\"    Patient has given verbal consent to a Video visit? Yes  How would you like to obtain your AVS? AVS Preference: MyChart.    Will anyone else be joining your video visit? No        Video Start Time: 2:25 PM    Additional provider notes:   Soraida Perkins 55 y.o.. female with   Chief Complaint   Patient presents with     med check     Medication Refill     Estrace , Paroxetine        S:  Med check  Doing well  Sleeping well on P4, trazodone, and gabapentin  A little bit of stress being  but nothing extraordinary.  paxil is going well.  Ankle injury May 2020. doing PT    O:  Constitutional: Patient is oriented to person, place, and time. Patient appears well-developed and well-nourished. No distress.   Head: Normocephalic and atraumatic.   Right Ear: External ear normal.   Left Ear: External ear normal.   Nose: Nose normal.   Eyes: Conjunctivae and EOM are normal. Right eye exhibits no discharge. Left eye exhibits no discharge. No scleral icterus.   Neurological: Patient is alert and oriented to person, place, and " time.   The patient has good eye contact.  No psychomotor retardation or stereotypical behaviors.  Speech was regular rate, regular rhythm, adequate responses.  Mood was stable and affect was congruent mood.  No suicidal or homicidal intent.  No hallucination.      A/P:  Diagnoses and all orders for this visit:    Postmenopausal hormone replacement therapy, hot flashes  Trial an increase in E2 to 1mg  -     estradioL (ESTRACE) 1 MG tablet; Take 1 tablet (1 mg total) by mouth daily.  Dispense: 90 tablet; Refill: 3  -     progesterone (PROMETRIUM) 200 MG capsule; Take 1 capsule (200 mg total) by mouth at bedtime.  Dispense: 90 capsule; Refill: 3  -     PARoxetine (PAXIL) 20 MG tablet; TAKE 1 TABLET BY MOUTH EVERY DAY IN THE MORNING  Dispense: 90 tablet; Refill: 3    Primary insomnia  -     gabapentin (NEURONTIN) 300 MG capsule; Take 1 capsule (300 mg total) by mouth at bedtime. Once at bedtime  Dispense: 90 capsule; Refill: 3  -     traZODone (DESYREL) 100 MG tablet; TAKE 1 TABLET BY MOUTH EVERY DAY  Dispense: 90 tablet; Refill: 3      F/u yearly      Video-Visit Details    Type of service:  Video Visit    Video End Time (time video stopped): 2:38 PM  Originating Location (pt. Location): Home    Distant Location (provider location):  [facility]     Platform used for Video Visit: Tito Díaz MD

## 2021-06-13 NOTE — TELEPHONE ENCOUNTER
Refill request for medication: trazodone 100 mg tab and progesterone 200 mg   Last visit addressing this medication: 06/10/2020  Follow up plan 2-4 weeks, was discussed at visit to possibly wean off trazodone    Last refill on progesterone 09/02/2020 qty 90 and trazodone 12/04/2019 qty 90 w/ 3 refills   CSA completed Not on file    checked  Do not have access     Appointment: Appointment scheduled for 12/21/2020     Debbie Rousseau LPN    Medications prepped for 30 day supply

## 2021-06-15 NOTE — PROGRESS NOTES
MTM Encounter    ASSESSMENT AND PLAN    1. Primary insomnia  Uncontrolled insomnia despite high dose trazodone use, which she finds modestly effective. Has failed several other sleep aid trials. She is also having bothersome hot flashes. Ideally, would like to find a medication that would help improve both of these symptoms. One option is paroxetine, which is approved for treatment of menopausal vasomotor symptoms and because it is a more sedating SSRI, can also help with sleep. Medication education and counseling was provided, including indication, expected effect, dosing instructions, and possible side effects. The patient's questions were answered. Will start with 10 mg, but can increase dose after a couple weeks if needed. [facility]. Other options to consider are gabapentin, which can also help with hot flashes. Otherwise, other sleep aids she has not tried include Lunesta, Belsomra, or TCAs.   Plan   1. Start paroxetine 10 mg at bedtime.     2. Menopause  Untreated vasomotor symptoms and insomnia, which may also be related to menopause. As discussed above, will start paroxetine, which will hopefully improve both her hot flashes and trouble sleeping. Otherwise, other options for vasomotor symptom treatment includes gabapentin, SNRI (venlafaxine), or referral to OB/GYN for hormone replacement therapy.         Return in about 1 month (around 2/12/2018).      SUBJECTIVE AND OBJECTIVE  Soraida Perkins is a 52 y.o. female here for a medication therapy management (MTM) appointment.     1. Primary insomnia  Soraida is taking trazodone 200 mg at bedtime for insomnia. This is modestly effective. She has struggled with insomnia for years. Last year we tried a switch to mirtazapine, but she found this ineffective. She has tried Ambien, but this made her nauseous. She's tried melatonin and diphenhydramine. Diphenhydramine works at first, then lose efficacy. She doesn't have trouble falling  asleep, but rather staying asleep and finds herself waking 10 times a night. Her FitBit also indicates she is not getting a restful night's sleep. Denies side effects from trazodone use.     2. Menopause  Soraida has been having hot flashes for about 10 years. She has hot flashes during the day and night sweats. She went on birth control for about 8 years, then stopped 2 years ago. She is interested in hormone replacement therapy or other medications to help relieve her symptoms. She does exercise regularly.           Soraida's medication list was reviewed with them, discussing reason for use, directions for use, and potential side effects of each medication as needed. Indication, safety, efficacy, and convenience was assessed for all medications addressed above.  No environmental factors were noted currently affecting patient.  This care plan was communicated via EMR with her primary care provider, Sonya Richard NP, who is the authorizing prescriber for this visit.  Direct supervision was available by either the patient's PCP or other available provider.    Time Spent: 40 minutes  Time and complexity billing metrics are included in the doc-flowsheet linked to this visit.       Ricarda Pino, PharmD, BCACP    Current Outpatient Prescriptions   Medication Sig Dispense Refill     PARoxetine (PAXIL) 10 MG tablet Take 1 tablet (10 mg total) by mouth daily. 30 tablet 2     traZODone (DESYREL) 150 MG tablet TAKE 1 TABLET (150 MG TOTAL) BY MOUTH AT BEDTIME. WITH 50 MG TABLET FOR A TOTAL  MG. 90 tablet 1     traZODone (DESYREL) 50 MG tablet TAKE 1 TABLET (50 MG TOTAL) BY MOUTH AT BEDTIME. WITH 150 MG TABLET FOR A TOTAL  MG. 90 tablet 1     No current facility-administered medications for this visit.

## 2021-06-15 NOTE — PROGRESS NOTES
Soraida Perkins is a 52 y.o. female who is here for a health maintenance visit.    She does have longstanding history of insomnia,   She has been taking Trazodone for years.   She state she is open to meeting with [facility] today to discuss other options.     Has tried OTC, sleep aid, melatonin, lavender essential oil, hot baths, relaxation without relief.     States 10 years ago she has tried ambien in the past, and it made her nauseous and did not like the side effects.   Has also tried magnesium without relief.   Trazodone does help but not fulyl, she also notes improvement in mood.    She is working out regularly, and healthy diet.   Denies any concerns with mood today.   She endorses hot flashes keeping her up at night.   Last menstural period 10/2017.    Acute concerns with low back pain, left lower back. Started two weeks ago.   Denies any injury - denies any new activity.   Notes increased inactivity around the holidays.   She described aching pain with intermittent sharp pain.   History of lower back pain often.   Rates her pain 4-5/10 and then increased pain intermittent  Bending makes it worse.   Nothing helps it.   She has not been taking anything. She did try to take Aleve, minimally helped.   She has gont to the chiropractor but not recently.   Denies numbness, tinglig or radiation of pain.   Denies fever, chills, bowel/bladder changes, progressive neurological changes      Reviewed past medical/surgical history, family history, social history, medications and updated chart below.     Allergies   Allergen Reactions     Morphine      GI upset,dizziness       Current Outpatient Prescriptions   Medication Sig Dispense Refill     traZODone (DESYREL) 150 MG tablet TAKE 1 TABLET (150 MG TOTAL) BY MOUTH AT BEDTIME. WITH 50 MG TABLET FOR A TOTAL  MG. 90 tablet 0     traZODone (DESYREL) 50 MG tablet TAKE 1 TABLET (50 MG TOTAL) BY MOUTH AT BEDTIME. WITH 150 MG TABLET FOR A TOTAL  MG. 90 tablet  "0     No current facility-administered medications for this visit.      No past medical history on file.  Past Surgical History:   Procedure Laterality Date     MA APPENDECTOMY      Description: Appendectomy;  Recorded: 06/16/2009;     Social History     Social History     Marital status:      Spouse name: Frank     Number of children: 4     Years of education: N/A     Occupational History      [employer]     Social History Main Topics     Smoking status: Never Smoker     Smokeless tobacco: Never Used     Alcohol use 0.0 - 0.5 oz/week     0 - 1 Standard drinks or equivalent per week     Drug use: No     Sexual activity: Yes     Partners: Male     Birth control/ protection: OCP     Other Topics Concern     None     Social History Narrative     No family history on file.      Review of Systems   Constitutional: Negative.   HENT: Negative.   Eyes: Negative.   Respiratory: Negative.   Cardiovascular: Negative.   Gastrointestinal: Negative.   Endocrine: Negative.   Genitourinary: Negative.   Musculoskeletal: Low back pain  Skin: Negative.   Allergic/Immunologic: Negative.   Neurological: Negative.   Hematological: Negative.   Psychiatric/Behavioral: Insomnia      Objective:     Physical Exam   /72 (Patient Site: Left Arm, Patient Position: Sitting, Cuff Size: Adult Regular)  Pulse 66  Resp 16  Ht 5' 5\" (1.651 m)  Wt 129 lb 9 oz (58.8 kg)  LMP 10/07/2017  SpO2 98%  Breastfeeding? No  BMI 21.56 kg/m2    Constitutional: Alert and oriented x3, well nourished without any acute distress  HENT:   Right Ear: External ear normal.   Left Ear: External ear normal.   Nose: Nose normal.   Mouth/Throat: Oropharynx is clear and moist.   Eyes: Conjunctivae and EOM are normal. Pupils are equal, round, and reactive to light. Right eye exhibits no discharge. Left eye exhibits no discharge.   Neck: No thyromegaly present.   Lymphadenopathy: Without palpable lymphadenopathy  Cardiovascular: " Normal S1 and S2. Regular rate, rhythm and no murmur, rubs or gallops. Palpable distal pulses bilaterally.  Pulmonary/Chest: Normal effort. Lungs clear to auscultation in all lobes. Without wheezing, rhonchi or crackles.    Abdominal: Soft, non tenderness. There is no rebound or guarding. Bowel sounds x4. Without organomegaly. No CVA tenderness.  Musculoskeletal: 5/5 strength  And full ROM in all extremities. No joint swelling or deformity. Tenderness to left lower lumbar spine  Neurological: Cranial nerves intact, without deficit. Without numbness, tingling or paresthesia. Normal reflexes  Skin: Dry and intact; without rashes or lesions.   Psychiatric: Normal mood and affect.   : External female genitalia without lesions. Introitus is normal, vaginal walls pink and moist without lesions. There is no cervical motion tenderness and the adnexa are without masses. There is no abnormal discharge from the cervix.   Breast: No chest deformity, asymmetry. Normal contours. Without nodules, masses, tenderness, or axillary adenopathy. No nipple discharge or dimpling noted.       1. Visit for screening mammogram  - Mammo Screening Bilateral; Future    2. Health care maintenance  Ordered colonoscopy and mammogram today.   I did order labs today. I did not order fasting cholesterol, she is not fasting today.   We discussed healthy lifestyle, nutrition, cardiovascular risk reduction, self care, safety, self breast exams, sunscreen, and seatbelt screening.  Pap smear completed today, I will follow up with results once resolved.   Recommended annual physical exam or sooner for any acute concerns.   Patient agreed and appeared pleased with plan     - Ambulatory referral for Colonoscopy  - Glycosylated Hemoglobin A1C  - Comprehensive Metabolic Panel  - HM2(CBC w/o Differential)  - Vitamin D, Total (25-Hydroxy)  - Gynecologic Cytology (PAP Smear)    3. Low back pain  Declined xray today, she would like to continue with conservative  treatment and try chiropractic care.   For back pain recommended activity to pain tolerance, proper stretches, ice/heat (20min on and 1 hour off).   Discussed red flags of back pain- fever, chills, progressive neurological changes, bowel/bladder changes to follow up for urgent evaluation.   Follow up in 2 weeks or sooner in needed for nay persistent worsening symptoms.   Follow up in clinic if any worsening symptoms, questions or concerns.  Patient agreed and appeared pleased with plan    4. Insomnia  5. Hot flashes  Continue with Trazodone 200 mg at night, max dose.   Please seen Ricarda Pino, Pharm D note today for consulation regarding medication, education insomnia/mood and hot flashes.   Reviewed home supportive care.   Discussed with patient to follow up if worsening symptoms, questions or concerns  Patient agreed and appeared pleased with plan

## 2021-06-16 PROBLEM — Z79.890 POSTMENOPAUSAL HORMONE REPLACEMENT THERAPY: Status: ACTIVE | Noted: 2020-06-10

## 2021-06-16 PROBLEM — F51.01 PRIMARY INSOMNIA: Status: ACTIVE | Noted: 2018-01-12

## 2021-06-16 PROBLEM — F41.8 SITUATIONAL ANXIETY: Status: ACTIVE | Noted: 2019-04-19

## 2021-06-27 ENCOUNTER — HEALTH MAINTENANCE LETTER (OUTPATIENT)
Age: 56
End: 2021-06-27

## 2021-07-03 NOTE — ADDENDUM NOTE
Addendum Note by Julianne Landrum RT (R) at 12/4/2019  1:20 PM     Author: Julianne Landrum RT (R) Service: -- Author Type: Radiologic Technologist    Filed: 12/4/2019  4:45 PM Encounter Date: 12/4/2019 Status: Signed    : Julianne Landrum RT (R) (Radiologic Technologist)    Addended by: JULIANNE LANDRUM on: 12/4/2019 04:45 PM        Modules accepted: Orders

## 2021-07-13 ENCOUNTER — RECORDS - HEALTHEAST (OUTPATIENT)
Dept: ADMINISTRATIVE | Facility: CLINIC | Age: 56
End: 2021-07-13

## 2021-07-21 ENCOUNTER — RECORDS - HEALTHEAST (OUTPATIENT)
Dept: ADMINISTRATIVE | Facility: CLINIC | Age: 56
End: 2021-07-21

## 2021-07-22 ENCOUNTER — RECORDS - HEALTHEAST (OUTPATIENT)
Dept: FAMILY MEDICINE | Facility: CLINIC | Age: 56
End: 2021-07-22

## 2021-07-22 DIAGNOSIS — Z12.31 OTHER SCREENING MAMMOGRAM: ICD-10-CM

## 2021-10-17 ENCOUNTER — HEALTH MAINTENANCE LETTER (OUTPATIENT)
Age: 56
End: 2021-10-17

## 2021-11-15 ENCOUNTER — TRANSFERRED RECORDS (OUTPATIENT)
Dept: HEALTH INFORMATION MANAGEMENT | Facility: CLINIC | Age: 56
End: 2021-11-15

## 2022-02-07 ENCOUNTER — TRANSFERRED RECORDS (OUTPATIENT)
Dept: HEALTH INFORMATION MANAGEMENT | Facility: CLINIC | Age: 57
End: 2022-02-07

## 2022-02-16 DIAGNOSIS — Z79.890 HORMONE REPLACEMENT THERAPY: ICD-10-CM

## 2022-02-18 NOTE — TELEPHONE ENCOUNTER
"Routing refill request to provider for review/approval because:  Patient needs to be seen because it has been more than 1 year since last office visit.  BP not current    Last Written Prescription Date:  12/21/20  Last Fill Quantity: 90,  # refills: 3   Last office visit provider:  12/21/20     Requested Prescriptions   Pending Prescriptions Disp Refills     progesterone (PROMETRIUM) 200 MG capsule [Pharmacy Med Name: PROGESTERONE 200 MG CAPSULE] 90 capsule 3     Sig: TAKE 1 CAPSULE BY MOUTH AT BEDTIME.       Hormone Replacement Therapy Failed - 2/18/2022  7:52 AM        Failed - Blood pressure under 140/90 in past 12 months     BP Readings from Last 3 Encounters:   06/10/20 106/55   12/18/19 100/68   12/04/19 100/60                 Failed - Recent (12 mo) or future (30 days) visit within the authorizing provider's specialty     Patient has had an office visit with the authorizing provider or a provider within the authorizing providers department within the previous 12 mos or has a future within next 30 days. See \"Patient Info\" tab in inbasket, or \"Choose Columns\" in Meds & Orders section of the refill encounter.              Failed - Patient has mammogram in past 2 years on file if age 50-75        Failed - Medication is active on med list        Passed - Patient is 18 years of age or older        Passed - No active pregnancy on record        Passed - No positive pregnancy test on record in past 12 months             Brad Cobb RN 02/18/22 7:53 AM  "

## 2022-02-23 RX ORDER — PROGESTERONE 200 MG/1
CAPSULE ORAL
Qty: 90 CAPSULE | Refills: 3 | Status: SHIPPED | OUTPATIENT
Start: 2022-02-23 | End: 2022-09-13

## 2022-03-20 DIAGNOSIS — Z79.890 POSTMENOPAUSAL HORMONE REPLACEMENT THERAPY: ICD-10-CM

## 2022-03-20 DIAGNOSIS — F51.01 PRIMARY INSOMNIA: ICD-10-CM

## 2022-03-22 RX ORDER — ESTRADIOL 1 MG/1
TABLET ORAL
Qty: 90 TABLET | Refills: 0 | Status: SHIPPED | OUTPATIENT
Start: 2022-03-22 | End: 2022-06-27

## 2022-03-22 RX ORDER — TRAZODONE HYDROCHLORIDE 100 MG/1
TABLET ORAL
Qty: 90 TABLET | Refills: 0 | Status: SHIPPED | OUTPATIENT
Start: 2022-03-22 | End: 2022-06-27

## 2022-03-22 NOTE — TELEPHONE ENCOUNTER
"Routing refill request to provider for review/approval because:  Patient needs to be seen because it has been more than 1 year since last office visit.    Last Written ESTRADIOL Date:  1/17/2022  Last Fill Quantity: 90,  # refills: 0     Last Written TRAZODONE Date:  1/17/2022  Last Fill Quantity: 90,  # refills: 0     Last office visit provider:  12/21/2020     Requested Prescriptions   Pending Prescriptions Disp Refills     estradiol (ESTRACE) 1 MG tablet [Pharmacy Med Name: ESTRADIOL 1 MG TABLET] 90 tablet 0     Sig: TAKE 1 TABLET BY MOUTH EVERY DAY       Hormone Replacement Therapy Failed - 3/20/2022  7:08 PM        Failed - Blood pressure under 140/90 in past 12 months     BP Readings from Last 3 Encounters:   06/10/20 106/55   12/18/19 100/68   12/04/19 100/60                 Failed - Recent (12 mo) or future (30 days) visit within the authorizing provider's specialty     Patient has had an office visit with the authorizing provider or a provider within the authorizing providers department within the previous 12 mos or has a future within next 30 days. See \"Patient Info\" tab in inbasket, or \"Choose Columns\" in Meds & Orders section of the refill encounter.              Failed - Patient has mammogram in past 2 years on file if age 50-75        Passed - Medication is active on med list        Passed - Patient is 18 years of age or older        Passed - No active pregnancy on record        Passed - No positive pregnancy test on record in past 12 months           traZODone (DESYREL) 100 MG tablet [Pharmacy Med Name: TRAZODONE 100 MG TABLET] 90 tablet 0     Sig: TAKE 1 TABLET BY MOUTH EVERY DAY       Serotonin Modulators Failed - 3/20/2022  7:08 PM        Failed - Recent (12 mo) or future (30 days) visit within the authorizing provider's specialty     Patient has had an office visit with the authorizing provider or a provider within the authorizing providers department within the previous 12 mos or has a future " "within next 30 days. See \"Patient Info\" tab in inbasket, or \"Choose Columns\" in Meds & Orders section of the refill encounter.              Passed - Medication is active on med list        Passed - Patient is age 18 or older        Passed - No active pregnancy on record        Passed - No positive pregnancy test in past 12 months             Kiana Snyder RN 03/22/22 8:17 AM  "

## 2022-05-25 ENCOUNTER — TRANSFERRED RECORDS (OUTPATIENT)
Dept: HEALTH INFORMATION MANAGEMENT | Facility: CLINIC | Age: 57
End: 2022-05-25

## 2022-07-20 DIAGNOSIS — N95.1 MENOPAUSAL SYNDROME (HOT FLASHES): ICD-10-CM

## 2022-07-20 NOTE — TELEPHONE ENCOUNTER
Reason for Call:  Medication or medication refill:    Do you use a [facility]?  Name of the pharmacy and phone number for the current request:  [facility]     Name of the medication requested: Paroxetine 20mg     Other request: none     Can we leave a detailed message on this number? YES    Phone number patient can be reached at: Home number on file [phone] (home)    Best Time: any     Call taken on 7/20/2022 at 4:58 PM by Jana Lopez

## 2022-07-21 RX ORDER — PAROXETINE 20 MG/1
TABLET, FILM COATED ORAL
Qty: 30 TABLET | Refills: 0 | Status: SHIPPED | OUTPATIENT
Start: 2022-07-21 | End: 2022-08-18

## 2022-07-21 NOTE — TELEPHONE ENCOUNTER
Last VV visit was 12/21/2020    Last Refill was 1/22/2022 90 days 1 refill   Message sent to pt that she is due for a px .

## 2022-07-24 ENCOUNTER — HEALTH MAINTENANCE LETTER (OUTPATIENT)
Age: 57
End: 2022-07-24

## 2022-08-17 ENCOUNTER — TELEPHONE (OUTPATIENT)
Dept: FAMILY MEDICINE | Facility: CLINIC | Age: 57
End: 2022-08-17

## 2022-09-13 ENCOUNTER — VIRTUAL VISIT (OUTPATIENT)
Dept: FAMILY MEDICINE | Facility: CLINIC | Age: 57
End: 2022-09-13
Payer: COMMERCIAL

## 2022-09-13 DIAGNOSIS — Z79.890 HORMONE REPLACEMENT THERAPY: ICD-10-CM

## 2022-09-13 DIAGNOSIS — F51.01 PRIMARY INSOMNIA: ICD-10-CM

## 2022-09-13 PROCEDURE — 99214 OFFICE O/P EST MOD 30 MIN: CPT | Mod: 95 | Performed by: FAMILY MEDICINE

## 2022-09-13 RX ORDER — PROGESTERONE 200 MG/1
CAPSULE ORAL
Qty: 90 CAPSULE | Refills: 3 | Status: SHIPPED | OUTPATIENT
Start: 2022-09-13 | End: 2023-09-18

## 2022-09-13 RX ORDER — TRAZODONE HYDROCHLORIDE 100 MG/1
100 TABLET ORAL DAILY
Qty: 90 TABLET | Refills: 3 | Status: SHIPPED | OUTPATIENT
Start: 2022-09-13 | End: 2023-08-16

## 2022-09-13 RX ORDER — ESTRADIOL 1 MG/1
1 TABLET ORAL DAILY
Qty: 90 TABLET | Refills: 3 | Status: SHIPPED | OUTPATIENT
Start: 2022-09-13 | End: 2023-10-10

## 2022-09-13 RX ORDER — PAROXETINE 20 MG/1
TABLET, FILM COATED ORAL
Qty: 90 TABLET | Refills: 3 | Status: SHIPPED | OUTPATIENT
Start: 2022-09-13 | End: 2023-10-10

## 2022-09-13 NOTE — PROGRESS NOTES
Soraida is a 56 year old who is being evaluated via a billable video visit.      Assessment & Plan     Hormone replacement therapy  Refilled  The patient has good eye contact.  No psychomotor retardation or stereotypical behaviors.  Speech was regular rate, regular rhythm, adequate responses.  Mood was stable and affect was congruent mood.  No suicidal or homicidal intent.  No hallucination.   Follow-up yearly  - progesterone (PROMETRIUM) 200 MG capsule  Dispense: 90 capsule; Refill: 3  - PARoxetine (PAXIL) 20 MG tablet  Dispense: 90 tablet; Refill: 3  - estradiol (ESTRACE) 1 MG tablet  Dispense: 90 tablet; Refill: 3    Primary insomnia  Refilled  Follow-up yearly  - traZODone (DESYREL) 100 MG tablet  Dispense: 90 tablet; Refill: 3    Lorenzo íDaz MD  [facility]   Soraida is a 56 year old presenting for the following health issues:  No chief complaint on file.      History of Present Illness       Reason for visit:  Med checkShe consumes 0 sweetened beverage(s) daily.She exercises with enough effort to increase her heart rate 30 to 60 minutes per day.  She exercises with enough effort to increase her heart rate 6 days per week.   She is taking medications regularly.     Here for med check  Doing well  Estradiol 1mg causes bloatiness hence only taking 0.5mg.   No more hot flashes  Sleeping well  Good mental health        Objective           Vitals:  No vitals were obtained today due to virtual visit.    Physical Exam     Constitutional: Patient is oriented to person, place, and time. Patient appears well-developed and well-nourished. No distress.   Head: Normocephalic and atraumatic.   Right Ear: External ear normal.   Left Ear: External ear normal.   Eyes: Conjunctivae and EOM are normal. Right eye exhibits no discharge. Left eye exhibits no discharge. No scleral icterus.   Neurological: Patient is alert and oriented to person, place, and time.  Skin: No  rash noted. Patient is not diaphoretic. No erythema. No pallor.    The patient has good eye contact.  No psychomotor retardation or stereotypical behaviors.  Speech was regular rate, regular rhythm, adequate responses.  Mood was stable and affect was congruent mood.  No suicidal or homicidal intent.  No hallucination.    Video-Visit Details    Video Start Time: 9:02 AM    Type of service:  Video Visit    Video End Time:9:12 AM    Originating Location (pt. Location): Home    Distant Location (provider location):  [facility]     Platform used for Video Visit: DynaPump

## 2022-10-02 ENCOUNTER — HEALTH MAINTENANCE LETTER (OUTPATIENT)
Age: 57
End: 2022-10-02

## 2023-08-12 ENCOUNTER — HEALTH MAINTENANCE LETTER (OUTPATIENT)
Age: 58
End: 2023-08-12

## 2023-08-16 DIAGNOSIS — F51.01 PRIMARY INSOMNIA: ICD-10-CM

## 2023-08-16 RX ORDER — TRAZODONE HYDROCHLORIDE 100 MG/1
100 TABLET ORAL DAILY
Qty: 90 TABLET | Refills: 0 | Status: SHIPPED | OUTPATIENT
Start: 2023-08-16 | End: 2023-10-10

## 2023-08-16 NOTE — TELEPHONE ENCOUNTER
Routing refill request to provider for review / approval because:   Last virtual visit 9/13/22  Last office visit 12/21/2020      Kindly,   [place] SHELTON RN  [facility]  08/16/23      Please reach out to patient to schedule an appointment and establish care. Thank you

## 2023-08-16 NOTE — TELEPHONE ENCOUNTER
Due for physical please help schedule with provider   Dysuria   WHAT YOU NEED TO KNOW:   Dysuria is difficulty urinating, or pain, burning, or discomfort with urination  Dysuria is usually a symptom of another problem  DISCHARGE INSTRUCTIONS:   Return to the emergency department if:   · You have severe back, side, or abdominal pain  · You have fever and shaking chills  · You vomit several times in a row  Contact your healthcare provider if:   · Your symptoms do not go away, even after treatment  · You have questions or concerns about your condition or care  Medicines:   · Medicines  may be given to help treat a bacterial infection or help decrease bladder spasms  · Take your medicine as directed  Contact your healthcare provider if you think your medicine is not helping or if you have side effects  Tell him of her if you are allergic to any medicine  Keep a list of the medicines, vitamins, and herbs you take  Include the amounts, and when and why you take them  Bring the list or the pill bottles to follow-up visits  Carry your medicine list with you in case of an emergency  Follow up with your healthcare provider as directed: Your healthcare provider may also refer you to a urologist or nephrologist to have additional testing  Write down your questions so you remember to ask them during your visits  Manage your dysuria:   · Drink more liquids  Liquids help flush out bacteria that may be causing an infection  Ask your healthcare provider how much liquid to drink each day and which liquids are best for you  · Take sitz baths as directed  Fill a bathtub with 4 to 6 inches of warm water  You may also use a sitz bath pan that fits over a toilet  Sit in the sitz bath for 20 minutes  Do this 2 to 3 times a day, or as directed  The warm water can help decrease pain and swelling  © 2017 [employer] Information is for End User's use only and may not be sold, redistributed or otherwise used for commercial purposes   All illustrations and images included in CareNotes® are the copyrighted property of A D [employer] M [employer] Inc  or Kristopher Sears  The above information is an  only  It is not intended as medical advice for individual conditions or treatments  Talk to your doctor, nurse or pharmacist before following any medical regimen to see if it is safe and effective for you

## 2023-09-17 DIAGNOSIS — Z79.890 HORMONE REPLACEMENT THERAPY: ICD-10-CM

## 2023-09-18 RX ORDER — PROGESTERONE 200 MG/1
CAPSULE ORAL
Qty: 90 CAPSULE | Refills: 0 | Status: SHIPPED | OUTPATIENT
Start: 2023-09-18 | End: 2023-10-10

## 2023-10-10 ENCOUNTER — OFFICE VISIT (OUTPATIENT)
Dept: FAMILY MEDICINE | Facility: CLINIC | Age: 58
End: 2023-10-10
Payer: COMMERCIAL

## 2023-10-10 VITALS
WEIGHT: 121 LBS | SYSTOLIC BLOOD PRESSURE: 100 MMHG | DIASTOLIC BLOOD PRESSURE: 60 MMHG | OXYGEN SATURATION: 100 % | HEART RATE: 64 BPM | BODY MASS INDEX: 20.16 KG/M2 | HEIGHT: 65 IN

## 2023-10-10 DIAGNOSIS — Z11.4 SCREENING FOR HIV (HUMAN IMMUNODEFICIENCY VIRUS): ICD-10-CM

## 2023-10-10 DIAGNOSIS — Z13.1 SCREENING FOR DIABETES MELLITUS: ICD-10-CM

## 2023-10-10 DIAGNOSIS — Z11.59 NEED FOR HEPATITIS C SCREENING TEST: ICD-10-CM

## 2023-10-10 DIAGNOSIS — Z00.00 ROUTINE GENERAL MEDICAL EXAMINATION AT A HEALTH CARE FACILITY: Primary | ICD-10-CM

## 2023-10-10 DIAGNOSIS — Z79.890 HORMONE REPLACEMENT THERAPY: ICD-10-CM

## 2023-10-10 DIAGNOSIS — F51.01 PRIMARY INSOMNIA: ICD-10-CM

## 2023-10-10 DIAGNOSIS — Z12.31 VISIT FOR SCREENING MAMMOGRAM: ICD-10-CM

## 2023-10-10 DIAGNOSIS — Z12.4 CERVICAL CANCER SCREENING: ICD-10-CM

## 2023-10-10 DIAGNOSIS — Z13.29 SCREENING FOR THYROID DISORDER: ICD-10-CM

## 2023-10-10 DIAGNOSIS — Z13.220 LIPID SCREENING: ICD-10-CM

## 2023-10-10 LAB
ANION GAP SERPL CALCULATED.3IONS-SCNC: 11 MMOL/L (ref 7–15)
BUN SERPL-MCNC: 19.9 MG/DL (ref 6–20)
CALCIUM SERPL-MCNC: 9.3 MG/DL (ref 8.6–10)
CHLORIDE SERPL-SCNC: 102 MMOL/L (ref 98–107)
CHOLEST SERPL-MCNC: 204 MG/DL
CREAT SERPL-MCNC: 1.02 MG/DL (ref 0.51–0.95)
DEPRECATED HCO3 PLAS-SCNC: 27 MMOL/L (ref 22–29)
EGFRCR SERPLBLD CKD-EPI 2021: 65 ML/MIN/1.73M2
GLUCOSE SERPL-MCNC: 84 MG/DL (ref 70–99)
HBA1C MFR BLD: 5.1 % (ref 0–5.6)
HCV AB SERPL QL IA: NONREACTIVE
HDLC SERPL-MCNC: 85 MG/DL
HIV 1+2 AB+HIV1 P24 AG SERPL QL IA: NONREACTIVE
LDLC SERPL CALC-MCNC: 108 MG/DL
NONHDLC SERPL-MCNC: 119 MG/DL
POTASSIUM SERPL-SCNC: 4.6 MMOL/L (ref 3.4–5.3)
SODIUM SERPL-SCNC: 140 MMOL/L (ref 135–145)
TRIGL SERPL-MCNC: 53 MG/DL
TSH SERPL DL<=0.005 MIU/L-ACNC: 2.35 UIU/ML (ref 0.3–4.2)

## 2023-10-10 PROCEDURE — 80048 BASIC METABOLIC PNL TOTAL CA: CPT | Performed by: PHYSICIAN ASSISTANT

## 2023-10-10 PROCEDURE — 99396 PREV VISIT EST AGE 40-64: CPT | Mod: 25 | Performed by: PHYSICIAN ASSISTANT

## 2023-10-10 PROCEDURE — 86803 HEPATITIS C AB TEST: CPT | Performed by: PHYSICIAN ASSISTANT

## 2023-10-10 PROCEDURE — 87389 HIV-1 AG W/HIV-1&-2 AB AG IA: CPT | Performed by: PHYSICIAN ASSISTANT

## 2023-10-10 PROCEDURE — 90480 ADMN SARSCOV2 VAC 1/ONLY CMP: CPT | Performed by: PHYSICIAN ASSISTANT

## 2023-10-10 PROCEDURE — 36415 COLL VENOUS BLD VENIPUNCTURE: CPT | Performed by: PHYSICIAN ASSISTANT

## 2023-10-10 PROCEDURE — 84443 ASSAY THYROID STIM HORMONE: CPT | Performed by: PHYSICIAN ASSISTANT

## 2023-10-10 PROCEDURE — 87624 HPV HI-RISK TYP POOLED RSLT: CPT | Performed by: PHYSICIAN ASSISTANT

## 2023-10-10 PROCEDURE — 90682 RIV4 VACC RECOMBINANT DNA IM: CPT | Performed by: PHYSICIAN ASSISTANT

## 2023-10-10 PROCEDURE — 80061 LIPID PANEL: CPT | Performed by: PHYSICIAN ASSISTANT

## 2023-10-10 PROCEDURE — 99213 OFFICE O/P EST LOW 20 MIN: CPT | Mod: 25 | Performed by: PHYSICIAN ASSISTANT

## 2023-10-10 PROCEDURE — 83036 HEMOGLOBIN GLYCOSYLATED A1C: CPT | Performed by: PHYSICIAN ASSISTANT

## 2023-10-10 PROCEDURE — 90471 IMMUNIZATION ADMIN: CPT | Performed by: PHYSICIAN ASSISTANT

## 2023-10-10 PROCEDURE — 91320 SARSCV2 VAC 30MCG TRS-SUC IM: CPT | Performed by: PHYSICIAN ASSISTANT

## 2023-10-10 PROCEDURE — G0145 SCR C/V CYTO,THINLAYER,RESCR: HCPCS | Performed by: PHYSICIAN ASSISTANT

## 2023-10-10 RX ORDER — ESTRADIOL 1 MG/1
1 TABLET ORAL DAILY
Qty: 45 TABLET | Refills: 3 | Status: SHIPPED | OUTPATIENT
Start: 2023-10-10 | End: 2023-10-12

## 2023-10-10 RX ORDER — PAROXETINE 20 MG/1
TABLET, FILM COATED ORAL
Qty: 90 TABLET | Refills: 3 | Status: SHIPPED | OUTPATIENT
Start: 2023-10-10

## 2023-10-10 RX ORDER — ZOSTER VACCINE RECOMBINANT, ADJUVANTED 50 MCG/0.5
KIT INTRAMUSCULAR
COMMUNITY
Start: 2023-06-16 | End: 2024-01-30

## 2023-10-10 RX ORDER — PROGESTERONE 200 MG/1
CAPSULE ORAL
Qty: 90 CAPSULE | Refills: 0 | Status: SHIPPED | OUTPATIENT
Start: 2023-10-10 | End: 2024-02-15

## 2023-10-10 RX ORDER — TRAZODONE HYDROCHLORIDE 100 MG/1
100 TABLET ORAL DAILY
Qty: 90 TABLET | Refills: 3 | Status: SHIPPED | OUTPATIENT
Start: 2023-10-10

## 2023-10-10 ASSESSMENT — ENCOUNTER SYMPTOMS
DYSURIA: 0
PALPITATIONS: 0
FREQUENCY: 0
MYALGIAS: 0
NERVOUS/ANXIOUS: 0
EYE PAIN: 0
HEMATOCHEZIA: 0
HEARTBURN: 0
FEVER: 0
ABDOMINAL PAIN: 0
WEAKNESS: 0
CONSTIPATION: 0
DIARRHEA: 0
HEMATURIA: 0
JOINT SWELLING: 0
COUGH: 0
HEADACHES: 0
DIZZINESS: 0
ARTHRALGIAS: 0
PARESTHESIAS: 0
CHILLS: 0
NAUSEA: 0
SORE THROAT: 0
SHORTNESS OF BREATH: 0
BREAST MASS: 0

## 2023-10-10 NOTE — PROGRESS NOTES
SUBJECTIVE:   CC: Soraida is an 57 year old who presents for preventive health visit.       10/10/2023     8:39 AM   Additional Questions   Roomed by Barb   Accompanied by self       Healthy Habits:     Getting at least 3 servings of Calcium per day:  Yes    Bi-annual eye exam:  NO    Dental care twice a year:  Yes    Sleep apnea or symptoms of sleep apnea:  None    Diet:  Gluten-free/reduced    Frequency of exercise:  2-3 days/week    Duration of exercise:  30-45 minutes    Taking medications regularly:  Yes    Medication side effects:  None    Additional concerns today:  Yes      Today's PHQ-2 Score:       10/10/2023     8:38 AM   PHQ-2 ( 1999 [employer])   Q1: Little interest or pleasure in doing things 0   Q2: Feeling down, depressed or hopeless 0   PHQ-2 Score 0   Q1: Little interest or pleasure in doing things Not at all   Q2: Feeling down, depressed or hopeless Not at all   PHQ-2 Score 0           Hormone replacement therapy due to menopause, tolerating well, needs refills today.   Doing well on paxil, needs refill today.         Have you ever done Advance Care Planning? (For example, a Health Directive, POLST, or a discussion with a medical provider or your loved ones about your wishes): No, advance care planning information given to patient to review.  Patient plans to discuss their wishes with loved ones or provider.      Social History     Tobacco Use    Smoking status: Never    Smokeless tobacco: Never   Substance Use Topics    Alcohol use: Yes     Alcohol/week: 0.0 - 0.8 standard drinks of alcohol     Comment: Alcoholic Drinks/day: minimal             10/10/2023     8:38 AM   Alcohol Use   Prescreen: >3 drinks/day or >7 drinks/week? No     Reviewed orders with patient.  Reviewed health maintenance and updated orders accordingly - Yes  Lab work is in process    Breast Cancer Screening:        10/10/2023     8:39 AM   Breast CA Risk Assessment (FHS-7)   Do you have a family history of breast, colon, or  ovarian cancer? No / Unknown         Mammogram Screening: Recommended mammography every 1-2 years with patient discussion and risk factor consideration  Pertinent mammograms are reviewed under the imaging tab.    History of abnormal Pap smear: NO - age 30-65 PAP every 5 years with negative HPV co-testing recommended      Latest Ref Rng & Units 1/12/2018     9:13 AM 7/22/2014     3:19 PM   PAP / HPV   PAP  Negative for squamous intraepithelial lesion or malignancy  Electronically signed by Ambika Francois CT (ASCP) on 1/20/2018 at 10:07 AM    Negative for squamous intraepithelial lesion or malignancy  Electronically signed by Lamar Loza CT (ASCP) on 7/31/2014 at 12:24 PM      HPV 16 DNA NEG Negative     HPV 18 DNA NEG Negative     Other HR HPV NEG Negative       Reviewed and updated as needed this visit by clinical staff   Tobacco  Allergies               Reviewed and updated as needed this visit by Provider                     Review of Systems   Constitutional:  Negative for chills and fever.   HENT:  Negative for congestion, ear pain, hearing loss and sore throat.    Eyes:  Negative for pain and visual disturbance.   Respiratory:  Negative for cough and shortness of breath.    Cardiovascular:  Negative for chest pain, palpitations and peripheral edema.   Gastrointestinal:  Negative for abdominal pain, constipation, diarrhea, heartburn, hematochezia and nausea.   Breasts:  Negative for tenderness, breast mass and discharge.   Genitourinary:  Negative for dysuria, frequency, genital sores, hematuria, pelvic pain, urgency, vaginal bleeding and vaginal discharge.   Musculoskeletal:  Negative for arthralgias, joint swelling and myalgias.   Skin:  Negative for rash.   Neurological:  Negative for dizziness, weakness, headaches and paresthesias.   Psychiatric/Behavioral:  Negative for mood changes. The patient is not nervous/anxious.           OBJECTIVE:   /60 (BP Location: Left arm, Patient Position:  "Sitting, Cuff Size: Adult Regular)   Pulse 64   Ht 1.645 m (5' 4.75\")   Wt 54.9 kg (121 lb)   LMP  (Exact Date)   SpO2 100%   BMI 20.29 kg/m    Physical Exam  GENERAL: healthy, alert and no distress  EYES: Eyes grossly normal to inspection, PERRL and conjunctivae and sclerae normal  HENT: ear canals and TM's normal, nose and mouth without ulcers or lesions  NECK: no adenopathy, no asymmetry, masses, or scars and thyroid normal to palpation  RESP: lungs clear to auscultation - no rales, rhonchi or wheezes  CV: regular rate and rhythm, normal S1 S2, no S3 or S4, no murmur, click or rub, no peripheral edema and peripheral pulses strong  ABDOMEN: soft, nontender, no hepatosplenomegaly, no masses and bowel sounds normal   (female): normal female external genitalia, normal urethral meatus, vaginal mucosa pink, moist, well rugated, and normal cervix/adnexa/uterus without masses or discharge  MS: no gross musculoskeletal defects noted, no edema  SKIN: no suspicious lesions or rashes  NEURO: Normal strength and tone, mentation intact and speech normal  PSYCH: mentation appears normal, affect normal/bright    Diagnostic Test Results:  Labs reviewed in Epic    ASSESSMENT/PLAN:   Soraida was seen today for establish care and physical.    Diagnoses and all orders for this visit:    Routine general medical examination at a health care facility  -     Basic metabolic panel  (Ca, Cl, CO2, Creat, Gluc, K, Na, BUN); Future  -     Basic metabolic panel  (Ca, Cl, CO2, Creat, Gluc, K, Na, BUN)    Screening for HIV (human immunodeficiency virus)  -     HIV Antigen Antibody Combo; Future  -     HIV Antigen Antibody Combo    Need for hepatitis C screening test  -     Hepatitis C Screen Reflex to HCV RNA Quant and Genotype; Future  -     Hepatitis C Screen Reflex to HCV RNA Quant and Genotype    Visit for screening mammogram  -     MA SCREENING DIGITAL BILAT - Future  (s+30); Future    Cervical cancer screening  -     Pap Screen with " HPV - recommended age 30 - 65 years    Hormone replacement therapy  Tolerating well, needs refill today  -     estradiol (ESTRACE) 1 MG tablet; Take 1 tablet (1 mg) by mouth daily Take half pill daily  -     PARoxetine (PAXIL) 20 MG tablet; Take 1 tab po daily  -     progesterone (PROMETRIUM) 200 MG capsule; TAKE 1 CAPSULE BY MOUTH EVERYDAY AT BEDTIME    Primary insomnia  Well controlled with trazodone  -     traZODone (DESYREL) 100 MG tablet; Take 1 tablet (100 mg) by mouth daily    Screening for thyroid disorder  -     TSH with free T4 reflex; Future  -     TSH with free T4 reflex    Screening for diabetes mellitus  -     Hemoglobin A1c; Future  -     Hemoglobin A1c    Lipid screening  -     Lipid Profile (Chol, Trig, HDL, LDL calc); Future  -     Lipid Profile (Chol, Trig, HDL, LDL calc)    Other orders  -     REVIEW OF HEALTH MAINTENANCE PROTOCOL ORDERS  -     TDAP 10-64Y (ADACEL,BOOSTRIX); Future  -     COVID-19 12+ (2023-24) (PFIZER)  -     PRIMARY CARE FOLLOW-UP SCHEDULING; Future  -     INFLUENZA VACCINE 18-64Y (FLUBLOK)          COUNSELING:  Reviewed preventive health counseling, as reflected in patient instructions        She reports that she has never smoked. She has never used smokeless tobacco.          Regine Rodgers PA-C  [facility]

## 2023-10-11 ENCOUNTER — TELEPHONE (OUTPATIENT)
Dept: FAMILY MEDICINE | Facility: CLINIC | Age: 58
End: 2023-10-11
Payer: COMMERCIAL

## 2023-10-11 NOTE — TELEPHONE ENCOUNTER
Routing to PCP for clarification.  Patient last seen 10/10/2023    estradiol (ESTRACE) 1 MG tablet    Take 1 tablet (1 mg) by mouth daily   Take half pill daily    Please advise as there is two different instructions for prescription

## 2023-10-11 NOTE — TELEPHONE ENCOUNTER
"Per [facility] : \"Please clarify these directions and resend. Thank you\"    Medication: Estradiol 1 MG  "

## 2023-10-12 DIAGNOSIS — Z79.890 HORMONE REPLACEMENT THERAPY: ICD-10-CM

## 2023-10-12 NOTE — TELEPHONE ENCOUNTER
Sig amended to reflect pt's dose being 0.5mg instead of 1 mg. See Telephone encounter from 10/11/2023.    Nora Mera RN

## 2023-10-13 LAB
BKR LAB AP GYN ADEQUACY: NORMAL
BKR LAB AP GYN INTERPRETATION: NORMAL
BKR LAB AP HPV REFLEX: NORMAL
BKR LAB AP PREVIOUS ABNORMAL: NORMAL
PATH REPORT.COMMENTS IMP SPEC: NORMAL
PATH REPORT.COMMENTS IMP SPEC: NORMAL
PATH REPORT.RELEVANT HX SPEC: NORMAL

## 2023-10-13 RX ORDER — ESTRADIOL 1 MG/1
0.5 TABLET ORAL DAILY
Qty: 45 TABLET | Refills: 3 | Status: SHIPPED | OUTPATIENT
Start: 2023-10-13

## 2023-10-17 LAB
HUMAN PAPILLOMA VIRUS 16 DNA: NEGATIVE
HUMAN PAPILLOMA VIRUS 18 DNA: NEGATIVE
HUMAN PAPILLOMA VIRUS FINAL DIAGNOSIS: NORMAL
HUMAN PAPILLOMA VIRUS OTHER HR: NEGATIVE

## 2023-11-07 ENCOUNTER — HOSPITAL ENCOUNTER (OUTPATIENT)
Dept: MAMMOGRAPHY | Facility: CLINIC | Age: 58
Discharge: HOME | End: 2023-11-07
Attending: PHYSICIAN ASSISTANT | Admitting: PHYSICIAN ASSISTANT
Payer: COMMERCIAL

## 2023-11-07 DIAGNOSIS — Z12.31 VISIT FOR SCREENING MAMMOGRAM: ICD-10-CM

## 2023-11-07 PROCEDURE — 77067 SCR MAMMO BI INCL CAD: CPT

## 2023-12-18 ENCOUNTER — TRANSFERRED RECORDS (OUTPATIENT)
Dept: HEALTH INFORMATION MANAGEMENT | Facility: CLINIC | Age: 58
End: 2023-12-18
Payer: COMMERCIAL

## 2023-12-18 ENCOUNTER — MYC MEDICAL ADVICE (OUTPATIENT)
Dept: FAMILY MEDICINE | Facility: CLINIC | Age: 58
End: 2023-12-18
Payer: COMMERCIAL

## 2023-12-18 DIAGNOSIS — D17.30 LIPOMA OF SKIN AND SUBCUTANEOUS TISSUE: Primary | ICD-10-CM

## 2023-12-19 NOTE — TELEPHONE ENCOUNTER
Sending to PCP for review.  Please review and advise on patient MyChart message.    Dermatology referral pended for PCP approval.     Jenna Vargas RN  [facility]

## 2023-12-27 ENCOUNTER — E-VISIT (OUTPATIENT)
Dept: URGENT CARE | Facility: CLINIC | Age: 58
End: 2023-12-27
Payer: COMMERCIAL

## 2023-12-27 DIAGNOSIS — J01.90 ACUTE BACTERIAL SINUSITIS: Primary | ICD-10-CM

## 2023-12-27 DIAGNOSIS — B96.89 ACUTE BACTERIAL SINUSITIS: Primary | ICD-10-CM

## 2023-12-27 PROCEDURE — 99421 OL DIG E/M SVC 5-10 MIN: CPT | Performed by: PHYSICIAN ASSISTANT

## 2023-12-27 RX ORDER — DOXYCYCLINE HYCLATE 100 MG
100 TABLET ORAL 2 TIMES DAILY
Qty: 14 TABLET | Refills: 0 | Status: SHIPPED | OUTPATIENT
Start: 2023-12-27 | End: 2024-01-03

## 2023-12-27 NOTE — PATIENT INSTRUCTIONS
Acute Sinusitis: Care Instructions  Overview     Acute sinusitis is an inflammation of the mucous membranes inside the nose and sinuses. Sinuses are the hollow spaces in your skull around the eyes and nose. Acute sinusitis often follows a cold. Acute sinusitis causes thick, discolored mucus that drains from the nose or down the back of the throat. It also can cause pain and pressure in your head and face along with a stuffy or blocked nose.  In most cases, sinusitis gets better on its own in 1 to 2 weeks. But some mild symptoms may last for several weeks. Sometimes antibiotics are needed if there is a bacterial infection.  Follow-up care is a key part of your treatment and safety. Be sure to make and go to all appointments, and call your doctor if you are having problems. It's also a good idea to know your test results and keep a list of the medicines you take.  How can you care for yourself at home?    Use saline (saltwater) nasal washes. This can help keep your nasal passages open and wash out mucus and allergens.  ? You can buy saline nose washes at a grocery store or drugstore. Follow the instructions on the package.  ? You can make your own at home. Add 1 teaspoon of non-iodized salt and 1 teaspoon of baking soda to 2 cups of distilled or boiled and cooled water. Fill a squeeze bottle or a nasal cleansing pot (such as a neti pot) with the nasal wash. Then put the tip into your nostril, and lean over the sink. With your mouth open, gently squirt the liquid. Repeat on the other side.    Try a decongestant nasal spray like oxymetazoline (Afrin). Do not use it for more than 3 days in a row. Using it for more than 3 days can make your congestion worse.    If needed, take an over-the-counter pain medicine, such as acetaminophen (Tylenol), ibuprofen (Advil, Motrin), or naproxen (Aleve). Read and follow all instructions on the label.    If the doctor prescribed antibiotics, take them as directed. Do not stop taking  "them just because you feel better. You need to take the full course of antibiotics.    Be careful when taking over-the-counter cold or flu medicines and Tylenol at the same time. Many of these medicines have acetaminophen, which is Tylenol. Read the labels to make sure that you are not taking more than the recommended dose. Too much acetaminophen (Tylenol) can be harmful.    Try a steroid nasal spray. It may help with your symptoms.    Breathe warm, moist air. You can use a steamy shower, a hot bath, or a sink filled with hot water. Avoid cold, dry air. Using a humidifier in your home may help. Follow the directions for cleaning the machine.  When should you call for help?   Call your doctor now or seek immediate medical care if:      You have new or worse swelling, redness, or pain in your face or around one or both of your eyes.       You have double vision or a change in your vision.       You have a high fever.       You have a severe headache and a stiff neck.       You have mental changes, such as feeling confused or much less alert.   Watch closely for changes in your health, and be sure to contact your doctor if:      You are not getting better as expected.   Where can you learn more?  Go to https://www.[employer].net/patiented  Enter I933 in the search box to learn more about \"Acute Sinusitis: Care Instructions.\"  Current as of: February 28, 2023               Content Version: 13.8    8230-5014 [employer].   Care instructions adapted under license by your healthcare professional. If you have questions about a medical condition or this instruction, always ask your healthcare professional. [employer] disclaims any warranty or liability for your use of this information.      You may want to try a nasal lavage (also known as nasal irrigation). You can find over-the-counter products, such as Neti-Pot, at retail locations or make your own at home. Instructions for homemade nasal lavage " and more information on the process are available online at http://www.aafp.org/afp/2009/1115/p1121.html.    Dear Soraida Perkins    After reviewing your responses, I've been able to diagnose you with Acute bacterial sinusitis.      Based on your responses and diagnosis, I have prescribed   Orders Placed This Encounter   Medications     doxycycline hyclate (VIBRA-TABS) 100 MG tablet     Sig: Take 1 tablet (100 mg) by mouth 2 times daily for 7 days     Dispense:  14 tablet     Refill:  0     May substitute any formulation of 100mg doxycycline available and best covered by insurance    to treat your symptoms. I have sent this to your pharmacy.?     It is also important to stay well hydrated, get lots of rest and take over-the-counter decongestants,?tylenol?or ibuprofen if you?are able to?take those medications per your primary care provider to help relieve discomfort.?     It is important that you take?all of?your prescribed medication even if your symptoms are improving after a few doses.? Taking?all of?your medicine helps prevent the symptoms from returning.?     If your symptoms worsen, you develop severe headache, vomiting, high fever (>102), or are not improving in 7 days, please contact your primary care provider for an appointment or visit any of our convenient Walk-in Care or Urgent Care Centers to be seen which can be found on our website?here.?     Thanks again for choosing?us?as your health care partner,?   ?  Baljit Simms PA-C?   Thank you for choosing us for your care. I have placed an order for a prescription so that you can start treatment. View your full visit summary for details by clicking on the link below. Your pharmacist will able to address any questions you may have about the medication.     If you're not feeling better within 5-7 days, please schedule an appointment.  You can schedule an appointment right here in [facility], or call [phone]  If the visit is for the same symptoms as your  eVisit, we'll refund the cost of your eVisit if seen within seven days.

## 2024-01-30 ENCOUNTER — OFFICE VISIT (OUTPATIENT)
Dept: FAMILY MEDICINE | Facility: CLINIC | Age: 59
End: 2024-01-30
Payer: COMMERCIAL

## 2024-01-30 VITALS
OXYGEN SATURATION: 99 % | DIASTOLIC BLOOD PRESSURE: 62 MMHG | SYSTOLIC BLOOD PRESSURE: 100 MMHG | WEIGHT: 126.5 LBS | BODY MASS INDEX: 21.21 KG/M2 | HEART RATE: 70 BPM

## 2024-01-30 DIAGNOSIS — G89.29 CHRONIC BILATERAL LOW BACK PAIN WITHOUT SCIATICA: Primary | ICD-10-CM

## 2024-01-30 DIAGNOSIS — D17.30 LIPOMA OF SKIN AND SUBCUTANEOUS TISSUE: ICD-10-CM

## 2024-01-30 DIAGNOSIS — M54.50 CHRONIC BILATERAL LOW BACK PAIN WITHOUT SCIATICA: Primary | ICD-10-CM

## 2024-01-30 DIAGNOSIS — N83.201 CYST OF RIGHT OVARY: ICD-10-CM

## 2024-01-30 LAB — ERYTHROCYTE [SEDIMENTATION RATE] IN BLOOD BY WESTERGREN METHOD: 6 MM/HR (ref 0–30)

## 2024-01-30 PROCEDURE — 90471 IMMUNIZATION ADMIN: CPT | Performed by: PHYSICIAN ASSISTANT

## 2024-01-30 PROCEDURE — 99213 OFFICE O/P EST LOW 20 MIN: CPT | Mod: 25 | Performed by: PHYSICIAN ASSISTANT

## 2024-01-30 PROCEDURE — 36415 COLL VENOUS BLD VENIPUNCTURE: CPT | Performed by: PHYSICIAN ASSISTANT

## 2024-01-30 PROCEDURE — 86140 C-REACTIVE PROTEIN: CPT | Performed by: PHYSICIAN ASSISTANT

## 2024-01-30 PROCEDURE — 86431 RHEUMATOID FACTOR QUANT: CPT | Performed by: PHYSICIAN ASSISTANT

## 2024-01-30 PROCEDURE — 86038 ANTINUCLEAR ANTIBODIES: CPT | Performed by: PHYSICIAN ASSISTANT

## 2024-01-30 PROCEDURE — 85652 RBC SED RATE AUTOMATED: CPT | Performed by: PHYSICIAN ASSISTANT

## 2024-01-30 PROCEDURE — 90750 HZV VACC RECOMBINANT IM: CPT | Performed by: PHYSICIAN ASSISTANT

## 2024-01-30 NOTE — PROGRESS NOTES
"  Assessment & Plan     Chronic bilateral low back pain without sciatica  Chronic bilateral lower back pain worsened after a fall in 2020 she is seeing a chiropractor who noted fatty tumors bilaterally and described them as back mice patient notes they will get inflamed periodically will get lab work today and will order MRI of lumbar spine and SI joints  Consider trigger point injections or referral to surgery based on MRI findings  - CRP, inflammation  - ESR: Erythrocyte sedimentation rate  - Rheumatoid factor  - Anti Nuclear Marita IgG by IFA with Reflex  - MR Lumbar Spine w/o Contrast  - MR Sacroilliac Joints wo Contrast  - CRP, inflammation  - ESR: Erythrocyte sedimentation rate  - Rheumatoid factor  - Anti Nuclear Marita IgG by IFA with Reflex                  Subjective   Soraida is a 58 year old, presenting for the following health issues:  Back Pain (For years tried different, pain is on and off/Chiro told \" Back mice\" /Dermatologist told her about back lipoma that can be causing it)        1/30/2024     8:07 AM   Additional Questions   Roomed by Barb   Accompanied by self     History of Present Illness       Back Pain:  She presents for follow up of back pain. Patient's back pain is a chronic problem.  Location of back pain:  Right middle of back and left middle of back  Description of back pain: dull ache, gnawing and sharp  Back pain spreads: right buttocks, left buttocks and left thigh    Since patient first noticed back pain, pain is: always present, but gets better and worse  Does back pain interfere with her job:  Not applicable       She eats 2-3 servings of fruits and vegetables daily.She consumes 0 sweetened beverage(s) daily.She exercises with enough effort to increase her heart rate 20 to 29 minutes per day.  She exercises with enough effort to increase her heart rate 3 or less days per week.   She is taking medications regularly.     Chronic back pain for many years notes increase in pain in 2020 " after a fall she followed with Ortho at [place] and had injections in her hip for left hip pain.  She has done PT and is currently seeing a chiropractor.  Her chiropractor noted lipomas in her back that she feels may be causing back pain and described back mice, pt presented to derm and had ultrasound that showed lipomas vs fatty tissue near si joints.  Pt notes that these fatty lesions get larger and smaller depending on inflammation on what she eats notes that she eats pizza they seem to be bigger and more painful.  Pain is located bilateral lower back but will sometimes radiate into the left buttock            Objective    /62 (BP Location: Left arm, Patient Position: Sitting, Cuff Size: Adult Regular)   Pulse 70   Wt 57.4 kg (126 lb 8 oz)   SpO2 99%   BMI 21.21 kg/m    Body mass index is 21.21 kg/m .  Physical Exam   GENERAL: alert and no distress  NECK: no adenopathy, no asymmetry, masses, or scars  RESP: lungs clear to auscultation - no rales, rhonchi or wheezes  CV: regular rate and rhythm, normal S1 S2,  no peripheral edema  ABDOMEN: soft, nontender, no hepatosplenomegaly, no masses and bowel sounds normal  MS: no gross musculoskeletal defects noted, soft mobile masses noted bilateral lower back near si joints no edema            Signed Electronically by: Regine Rodgers PA-C

## 2024-01-31 LAB
ANA SER QL IF: NEGATIVE
CRP SERPL-MCNC: <3 MG/L
RHEUMATOID FACT SERPL-ACNC: <10 IU/ML

## 2024-02-13 ENCOUNTER — HOSPITAL ENCOUNTER (OUTPATIENT)
Dept: MRI IMAGING | Facility: CLINIC | Age: 59
Discharge: HOME | End: 2024-02-13
Attending: PHYSICIAN ASSISTANT
Payer: COMMERCIAL

## 2024-02-13 DIAGNOSIS — G89.29 CHRONIC BILATERAL LOW BACK PAIN WITHOUT SCIATICA: ICD-10-CM

## 2024-02-13 DIAGNOSIS — M54.50 CHRONIC BILATERAL LOW BACK PAIN WITHOUT SCIATICA: ICD-10-CM

## 2024-02-13 PROCEDURE — 72195 MRI PELVIS W/O DYE: CPT

## 2024-02-13 PROCEDURE — 72148 MRI LUMBAR SPINE W/O DYE: CPT

## 2024-02-15 DIAGNOSIS — Z79.890 HORMONE REPLACEMENT THERAPY: ICD-10-CM

## 2024-02-15 NOTE — TELEPHONE ENCOUNTER
Refill Request  Medication name: Pending Prescriptions:                       Disp   Refills    progesterone (PROMETRIUM) 200 MG capsule  90 cap*0            Sig: TAKE 1 CAPSULE BY MOUTH EVERYDAY AT BEDTIME    Who prescribed the medication: Regine Rodgers  Last refill on medication: 10/10/23  [facility]  Last appointment with PCP: 1/30/24  Next appointment: Not due

## 2024-02-16 ENCOUNTER — OFFICE VISIT (OUTPATIENT)
Dept: SURGERY | Facility: CLINIC | Age: 59
End: 2024-02-16
Payer: COMMERCIAL

## 2024-02-16 ENCOUNTER — TELEPHONE (OUTPATIENT)
Dept: SURGERY | Facility: CLINIC | Age: 59
End: 2024-02-16

## 2024-02-16 ENCOUNTER — ANESTHESIA EVENT (OUTPATIENT)
Dept: SURGERY | Facility: AMBULATORY SURGERY CENTER | Age: 59
End: 2024-02-16
Payer: COMMERCIAL

## 2024-02-16 VITALS — HEIGHT: 65 IN | BODY MASS INDEX: 20.99 KG/M2 | WEIGHT: 126 LBS

## 2024-02-16 DIAGNOSIS — D17.30 LIPOMA OF SKIN AND SUBCUTANEOUS TISSUE: ICD-10-CM

## 2024-02-16 PROCEDURE — 99242 OFF/OP CONSLTJ NEW/EST SF 20: CPT | Performed by: SURGERY

## 2024-02-16 RX ORDER — PROGESTERONE 200 MG/1
CAPSULE ORAL
Qty: 90 CAPSULE | Refills: 2 | Status: SHIPPED | OUTPATIENT
Start: 2024-02-16

## 2024-02-16 RX ORDER — ACETAMINOPHEN 325 MG/1
975 TABLET ORAL ONCE
Status: CANCELLED | OUTPATIENT
Start: 2024-02-16 | End: 2024-02-16

## 2024-02-16 NOTE — PROGRESS NOTES
HPI: Soraida Perkins is a 58 year old female referred to see me by Regine Rodgers for lipomas involving her lower back.  She notes she has been aware of these for years, and attributes a variety of maladies to them including pain and discomfort.  She states these tend to vary in size depending on what she has eaten, with waxing and waning course.  She notes that because of this discomfort she has some limitations on being as physically active as she would like to be.   As part of the workup for this she has undergone both ultrasound and MRI imaging.    Allergies:Cefoxitin, Meperidine, Morphine, and Propofol    Prior Medical History:   Past Medical History:   Diagnosis Date    Motion sickness     Other chronic pain        Prior Surgical History:   Past Surgical History:   Procedure Laterality Date    [facility] APPENDECTOMY      Description: Appendectomy;  Recorded: 06/16/2009;       CURRENT MEDS:  Prior to Admission medications    Medication Sig Start Date End Date Taking? Authorizing Provider   cholecalciferol (VITAMIN D3) 10 mcg (400 units) TABS tablet daily   Yes Reported, Patient   estradiol (ESTRACE) 1 MG tablet Take 0.5 tablets (0.5 mg) by mouth daily 10/13/23  Yes Regine Rodgers PA-C   PARoxetine (PAXIL) 20 MG tablet Take 1 tab po daily 10/10/23  Yes Regine Rodgers PA-C   progesterone (PROMETRIUM) 200 MG capsule TAKE 1 CAPSULE BY MOUTH EVERYDAY AT BEDTIME 10/10/23  Yes Regine Rodgers PA-C   traZODone (DESYREL) 100 MG tablet Take 1 tablet (100 mg) by mouth daily 10/10/23  Yes Regine Rodgers PA-C         Family History   Problem Relation Age of Onset    Diabetes Mother     Hypertension Mother     Migraines Father     Hypertension Father     Migraines Sister     Diabetes Maternal Grandmother     Diabetes Paternal Grandmother         reports that she has never smoked. She has never used smokeless tobacco. She reports current alcohol use. She reports that she does not use drugs.    History   Smoking Status    Never  "  Smokeless Tobacco    Never       Review of Systems -    The 10 point review of systems is within normal limits except as noted in HPI.    Ht 1.645 m (5' 4.75\")   Wt 57.2 kg (126 lb)   BMI 21.13 kg/m    126 lbs 0 oz  Body mass index is 21.13 kg/m .    EXAM:  GENERAL: Alert, cooperative, appears stated age  SKIN: A 2 cm palpable lump is noted on the right side of her back, just overlying the SI joint.  This is mobile and feels as though it is in the deep subcutaneous tissue.  With regard to the left side, I am unable to palpate or identify any subcutaneous nodules, and she is unable to identify 1 on the left side for me today.      LABS:  Lab Results   Component Value Date    HGB 14.6 12/06/2019    WBC 3.8 (L) 12/06/2019     12/06/2019    AST 21 12/06/2019    ALT 16 12/06/2019    ALKPHOS 58 12/06/2019    BILITOTAL 0.4 12/06/2019     Imaging: Ultrasound and MRI imaging reviewed.  The ultrasound imaging dictates a 5 x 1 cm mass in the subcutaneous tissue on the right side, but is not clearly demarcated on ultrasound imaging.  Corresponding ultrasound imaging suggests an encapsulated subcutaneous fatty nodule in this area, but again is not definitive.  Nothing noted on the left side MR study.      Assessment/Plan:   1. Lipoma of skin and subcutaneous tissue          Soraida Perkins is a 58 year old female with signs and symptoms consistent with a right back angiolipoma or neurofibroma.  I do not appreciate anything on the left side.  We discussed that her symptoms are somewhat uncharacteristic of a lipoma or angiolipoma, as these things do not vary in size rapidly and have no association with any GI activity such as eating drinking or defecating.  I think it unlikely that these masses are associated with some of her underlying symptoms.  Given there presents however, I do think it is reasonable to excise the palpable node on the right side to see if this leads to any improvement in some of the " musculoskeletal discomfort that she is having.    She understands everything which was discussed and has consented to proceed with a excision under MAC.  Surgery will be scheduled at a time that works for the patient.      25 minutes spent on the date of the encounter doing chart review, patient visit, and documentation    Felipe Kilgore MD ,MD  [facility] Department of Surgery

## 2024-02-16 NOTE — LETTER
2/16/2024         RE: Soraida Perkins  [address]  [facility] [address]        Dear Colleague,    Thank you for referring your patient, Soraida Perkins, to the [facility] SURGERY [facility] AND BARIATRICS CARE [place]. Please see a copy of my visit note below.    HPI: Soraida Perkins is a 58 year old female referred to see me by Regine Rodgers for lipomas involving her lower back.  She notes she has been aware of these for years, and attributes a variety of maladies to them including pain and discomfort.  She states these tend to vary in size depending on what she has eaten, with waxing and waning course.  She notes that because of this discomfort she has some limitations on being as physically active as she would like to be.   As part of the workup for this she has undergone both ultrasound and MRI imaging.    Allergies:Cefoxitin, Meperidine, Morphine, and Propofol    Prior Medical History:   Past Medical History:   Diagnosis Date     Motion sickness      Other chronic pain        Prior Surgical History:   Past Surgical History:   Procedure Laterality Date     [facility] APPENDECTOMY      Description: Appendectomy;  Recorded: 06/16/2009;       CURRENT MEDS:  Prior to Admission medications    Medication Sig Start Date End Date Taking? Authorizing Provider   cholecalciferol (VITAMIN D3) 10 mcg (400 units) TABS tablet daily   Yes Reported, Patient   estradiol (ESTRACE) 1 MG tablet Take 0.5 tablets (0.5 mg) by mouth daily 10/13/23  Yes Regine Rodgers PA-C   PARoxetine (PAXIL) 20 MG tablet Take 1 tab po daily 10/10/23  Yes Regine Rodgers PA-C   progesterone (PROMETRIUM) 200 MG capsule TAKE 1 CAPSULE BY MOUTH EVERYDAY AT BEDTIME 10/10/23  Yes Regine Rodgers PA-C   traZODone (DESYREL) 100 MG tablet Take 1 tablet (100 mg) by mouth daily 10/10/23  Yes Regine Rodgers PA-C         Family History   Problem Relation Age of Onset     Diabetes Mother      Hypertension Mother      Migraines Father      Hypertension  "Father      Migraines Sister      Diabetes Maternal Grandmother      Diabetes Paternal Grandmother         reports that she has never smoked. She has never used smokeless tobacco. She reports current alcohol use. She reports that she does not use drugs.    History   Smoking Status     Never   Smokeless Tobacco     Never       Review of Systems -    The 10 point review of systems is within normal limits except as noted in HPI.    Ht 1.645 m (5' 4.75\")   Wt 57.2 kg (126 lb)   BMI 21.13 kg/m    126 lbs 0 oz  Body mass index is 21.13 kg/m .    EXAM:  GENERAL: Alert, cooperative, appears stated age  SKIN: A 2 cm palpable lump is noted on the right side of her back, just overlying the SI joint.  This is mobile and feels as though it is in the deep subcutaneous tissue.  With regard to the left side, I am unable to palpate or identify any subcutaneous nodules, and she is unable to identify 1 on the left side for me today.      LABS:  Lab Results   Component Value Date    HGB 14.6 12/06/2019    WBC 3.8 (L) 12/06/2019     12/06/2019    AST 21 12/06/2019    ALT 16 12/06/2019    ALKPHOS 58 12/06/2019    BILITOTAL 0.4 12/06/2019     Imaging: Ultrasound and MRI imaging reviewed.  The ultrasound imaging dictates a 5 x 1 cm mass in the subcutaneous tissue on the right side, but is not clearly demarcated on ultrasound imaging.  Corresponding ultrasound imaging suggests an encapsulated subcutaneous fatty nodule in this area, but again is not definitive.  Nothing noted on the left side MR study.      Assessment/Plan:   1. Lipoma of skin and subcutaneous tissue          Soraida Perkins is a 58 year old female with signs and symptoms consistent with a right back angiolipoma or neurofibroma.  I do not appreciate anything on the left side.  We discussed that her symptoms are somewhat uncharacteristic of a lipoma or angiolipoma, as these things do not vary in size rapidly and have no association with any GI activity such as " eating drinking or defecating.  I think it unlikely that these masses are associated with some of her underlying symptoms.  Given there presents however, I do think it is reasonable to excise the palpable node on the right side to see if this leads to any improvement in some of the musculoskeletal discomfort that she is having.    She understands everything which was discussed and has consented to proceed with a excision under MAC.  Surgery will be scheduled at a time that works for the patient.      25 minutes spent on the date of the encounter doing chart review, patient visit, and documentation    Felipe Kilgore MD ,MD  [facility] Department of Surgery      Again, thank you for allowing me to participate in the care of your patient.        Sincerely,        Felipe Kilgore MD

## 2024-02-16 NOTE — TELEPHONE ENCOUNTER
Spoke with patient today regarding surgery scheduling     Went over details/instructions.    Surgery Letter given to patient in clinic.  (Please see LETTERS TAB in chart to retrieve a copy of this letter)

## 2024-02-16 NOTE — LETTER
Pre-op Physical: To be done by Dr Kilgore day of procedure.    Surgery Date: 2/19/2024     Location: [place] [address]    Approximate Arrival Time: 11:00am  (Unless instructed differently by the pre-op call nurse)     Pre-Surgical Tasks:     Review all medications with your primary care or prescribing physician; they will advise you which meds to stop and when, and when you can resume taking.  Certain medications like blood thinners and weight loss medications need to be stopped in advance of surgery to proceed safely.      Blood thinners including but not exclusive to drugs like Xarelto, Eliquis, Warfarin and Aspirin, should be stopped five days before surgery, if your prescribing provider agrees. Follow your provider's advice on stopping blood thinners because they know you best.  If you are unsure if your medication is a blood thinner, ask your prescribing provider.    Weight loss medications: There are multiple medications being used for weight management and diabetes today, and the list is growing.  Phentermine, Ozempic, Wegovy, Trulicity, and other similar medications need to be stopped one week before surgery to avoid being cancelled.  Victoza and Saxenda can be continued longer but must be stopped one full day before surgery.  Please ask your prescribing provider for advice.    Diabetic medications: in addition to the medications talked about above that are used for either weight loss or diabetes, some people are on insulin that may require adjustment.  Please discuss managing diabetic medications with your prescribing doctor as these medications may require modification prior to surgery.     Please shower the evening before and morning of surgery with Hibiclens soap.  This can be found at your local pharmacy.     Fasting instructions will be provided by the pre-op nurse who will call you 1-3 days before surgery.  Typically, we advise normal food up  to 8 hours before you arrive for surgery. Clear liquids only from then until 2 hours before you arrive surgery, then nothing at all by mouth.  The nurse will review your specific instructions with you at the call.      Smoking impacts your body's ability to heal properly so we advise patients to quit if possible before surgery.  Plastic Surgery patients are required to be nicotine free for at least 8 weeks before surgery.      You will need an adult to drive you home and stay with you 24 hours after surgery. Public transportation or Medical Van Services are not permitted.    Visitor restrictions are subject to change, please verify with the pre-op nurse when they call how many people are permitted to accompany you.    We always encourage you to notify your insurance any time you have medical tests or procedures scheduled including surgery. The number is usually right on the back of your insurance card. To obtain pricing for surgery, please call  [facility] Cost of Care at [phone] or email [email or web address].        Call our office if you have any questions! Thank you!     Rl Elizabeth  Complex   [facility]  [phone]

## 2024-02-16 NOTE — H&P (VIEW-ONLY)
HPI: Soraida Perkins is a 58 year old female referred to see me by Regine Rodgers for lipomas involving her lower back.  She notes she has been aware of these for years, and attributes a variety of maladies to them including pain and discomfort.  She states these tend to vary in size depending on what she has eaten, with waxing and waning course.  She notes that because of this discomfort she has some limitations on being as physically active as she would like to be.   As part of the workup for this she has undergone both ultrasound and MRI imaging.    Allergies:Cefoxitin, Meperidine, Morphine, and Propofol    Prior Medical History:   Past Medical History:   Diagnosis Date    Motion sickness     Other chronic pain        Prior Surgical History:   Past Surgical History:   Procedure Laterality Date    [facility] APPENDECTOMY      Description: Appendectomy;  Recorded: 06/16/2009;       CURRENT MEDS:  Prior to Admission medications    Medication Sig Start Date End Date Taking? Authorizing Provider   cholecalciferol (VITAMIN D3) 10 mcg (400 units) TABS tablet daily   Yes Reported, Patient   estradiol (ESTRACE) 1 MG tablet Take 0.5 tablets (0.5 mg) by mouth daily 10/13/23  Yes Regine Rodgers PA-C   PARoxetine (PAXIL) 20 MG tablet Take 1 tab po daily 10/10/23  Yes Regine Rodgers PA-C   progesterone (PROMETRIUM) 200 MG capsule TAKE 1 CAPSULE BY MOUTH EVERYDAY AT BEDTIME 10/10/23  Yes Regine Rodgers PA-C   traZODone (DESYREL) 100 MG tablet Take 1 tablet (100 mg) by mouth daily 10/10/23  Yes Regine Rodgers PA-C         Family History   Problem Relation Age of Onset    Diabetes Mother     Hypertension Mother     Migraines Father     Hypertension Father     Migraines Sister     Diabetes Maternal Grandmother     Diabetes Paternal Grandmother         reports that she has never smoked. She has never used smokeless tobacco. She reports current alcohol use. She reports that she does not use drugs.    History   Smoking Status    Never  "  Smokeless Tobacco    Never       Review of Systems -    The 10 point review of systems is within normal limits except as noted in HPI.    Ht 1.645 m (5' 4.75\")   Wt 57.2 kg (126 lb)   BMI 21.13 kg/m    126 lbs 0 oz  Body mass index is 21.13 kg/m .    EXAM:  GENERAL: Alert, cooperative, appears stated age  SKIN: A 2 cm palpable lump is noted on the right side of her back, just overlying the SI joint.  This is mobile and feels as though it is in the deep subcutaneous tissue.  With regard to the left side, I am unable to palpate or identify any subcutaneous nodules, and she is unable to identify 1 on the left side for me today.      LABS:  Lab Results   Component Value Date    HGB 14.6 12/06/2019    WBC 3.8 (L) 12/06/2019     12/06/2019    AST 21 12/06/2019    ALT 16 12/06/2019    ALKPHOS 58 12/06/2019    BILITOTAL 0.4 12/06/2019     Imaging: Ultrasound and MRI imaging reviewed.  The ultrasound imaging dictates a 5 x 1 cm mass in the subcutaneous tissue on the right side, but is not clearly demarcated on ultrasound imaging.  Corresponding ultrasound imaging suggests an encapsulated subcutaneous fatty nodule in this area, but again is not definitive.  Nothing noted on the left side MR study.      Assessment/Plan:   1. Lipoma of skin and subcutaneous tissue          Soraida Perkins is a 58 year old female with signs and symptoms consistent with a right back angiolipoma or neurofibroma.  I do not appreciate anything on the left side.  We discussed that her symptoms are somewhat uncharacteristic of a lipoma or angiolipoma, as these things do not vary in size rapidly and have no association with any GI activity such as eating drinking or defecating.  I think it unlikely that these masses are associated with some of her underlying symptoms.  Given there presents however, I do think it is reasonable to excise the palpable node on the right side to see if this leads to any improvement in some of the " musculoskeletal discomfort that she is having.    She understands everything which was discussed and has consented to proceed with a excision under MAC.  Surgery will be scheduled at a time that works for the patient.      25 minutes spent on the date of the encounter doing chart review, patient visit, and documentation    Felipe Kilgore MD ,MD  [facility] Department of Surgery

## 2024-02-19 ENCOUNTER — ANESTHESIA (OUTPATIENT)
Dept: SURGERY | Facility: AMBULATORY SURGERY CENTER | Age: 59
End: 2024-02-19
Payer: COMMERCIAL

## 2024-02-19 ENCOUNTER — HOSPITAL ENCOUNTER (OUTPATIENT)
Facility: AMBULATORY SURGERY CENTER | Age: 59
Discharge: HOME | End: 2024-02-19
Attending: SURGERY
Payer: COMMERCIAL

## 2024-02-19 VITALS
HEART RATE: 62 BPM | TEMPERATURE: 97.5 F | BODY MASS INDEX: 20.99 KG/M2 | DIASTOLIC BLOOD PRESSURE: 56 MMHG | OXYGEN SATURATION: 96 % | SYSTOLIC BLOOD PRESSURE: 100 MMHG | WEIGHT: 126 LBS | RESPIRATION RATE: 12 BRPM | HEIGHT: 65 IN

## 2024-02-19 DIAGNOSIS — D17.30 LIPOMA OF SKIN AND SUBCUTANEOUS TISSUE: ICD-10-CM

## 2024-02-19 PROCEDURE — 21931 EXC BACK LES SC 3 CM/>: CPT | Performed by: SURGERY

## 2024-02-19 RX ORDER — ONDANSETRON 2 MG/ML
4 INJECTION INTRAMUSCULAR; INTRAVENOUS EVERY 30 MIN PRN
Status: DISCONTINUED | OUTPATIENT
Start: 2024-02-19 | End: 2024-02-20 | Stop reason: HOSPADM

## 2024-02-19 RX ORDER — OXYCODONE HYDROCHLORIDE 5 MG/1
5 TABLET ORAL
Status: DISCONTINUED | OUTPATIENT
Start: 2024-02-19 | End: 2024-02-20 | Stop reason: HOSPADM

## 2024-02-19 RX ORDER — ONDANSETRON 4 MG/1
4 TABLET, ORALLY DISINTEGRATING ORAL EVERY 30 MIN PRN
Status: DISCONTINUED | OUTPATIENT
Start: 2024-02-19 | End: 2024-02-20 | Stop reason: HOSPADM

## 2024-02-19 RX ORDER — OXYCODONE HYDROCHLORIDE 10 MG/1
10 TABLET ORAL
Status: DISCONTINUED | OUTPATIENT
Start: 2024-02-19 | End: 2024-02-20 | Stop reason: HOSPADM

## 2024-02-19 RX ORDER — SODIUM CHLORIDE, SODIUM LACTATE, POTASSIUM CHLORIDE, CALCIUM CHLORIDE 600; 310; 30; 20 MG/100ML; MG/100ML; MG/100ML; MG/100ML
INJECTION, SOLUTION INTRAVENOUS CONTINUOUS
Status: DISCONTINUED | OUTPATIENT
Start: 2024-02-19 | End: 2024-02-20 | Stop reason: HOSPADM

## 2024-02-19 RX ORDER — SODIUM CHLORIDE, SODIUM LACTATE, POTASSIUM CHLORIDE, CALCIUM CHLORIDE 600; 310; 30; 20 MG/100ML; MG/100ML; MG/100ML; MG/100ML
INJECTION, SOLUTION INTRAVENOUS CONTINUOUS PRN
Status: DISCONTINUED | OUTPATIENT
Start: 2024-02-19 | End: 2024-02-19

## 2024-02-19 RX ORDER — LIDOCAINE 40 MG/G
CREAM TOPICAL
Status: DISCONTINUED | OUTPATIENT
Start: 2024-02-19 | End: 2024-02-20 | Stop reason: HOSPADM

## 2024-02-19 RX ORDER — EPHEDRINE SULFATE 50 MG/ML
INJECTION, SOLUTION INTRAMUSCULAR; INTRAVENOUS; SUBCUTANEOUS PRN
Status: DISCONTINUED | OUTPATIENT
Start: 2024-02-19 | End: 2024-02-19

## 2024-02-19 RX ORDER — CEFAZOLIN SODIUM 2 G/100ML
2 INJECTION, SOLUTION INTRAVENOUS
Status: COMPLETED | OUTPATIENT
Start: 2024-02-19 | End: 2024-02-19

## 2024-02-19 RX ORDER — CEFAZOLIN SODIUM 2 G/100ML
2 INJECTION, SOLUTION INTRAVENOUS SEE ADMIN INSTRUCTIONS
Status: DISCONTINUED | OUTPATIENT
Start: 2024-02-19 | End: 2024-02-20 | Stop reason: HOSPADM

## 2024-02-19 RX ORDER — PROPOFOL 10 MG/ML
INJECTION, EMULSION INTRAVENOUS CONTINUOUS PRN
Status: DISCONTINUED | OUTPATIENT
Start: 2024-02-19 | End: 2024-02-19

## 2024-02-19 RX ORDER — ACETAMINOPHEN 325 MG/1
975 TABLET ORAL ONCE
Status: COMPLETED | OUTPATIENT
Start: 2024-02-19 | End: 2024-02-19

## 2024-02-19 RX ORDER — DEXAMETHASONE SODIUM PHOSPHATE 4 MG/ML
INJECTION, SOLUTION INTRA-ARTICULAR; INTRALESIONAL; INTRAMUSCULAR; INTRAVENOUS; SOFT TISSUE PRN
Status: DISCONTINUED | OUTPATIENT
Start: 2024-02-19 | End: 2024-02-19

## 2024-02-19 RX ORDER — FENTANYL CITRATE 50 UG/ML
INJECTION, SOLUTION INTRAMUSCULAR; INTRAVENOUS PRN
Status: DISCONTINUED | OUTPATIENT
Start: 2024-02-19 | End: 2024-02-19

## 2024-02-19 RX ORDER — ONDANSETRON 2 MG/ML
INJECTION INTRAMUSCULAR; INTRAVENOUS PRN
Status: DISCONTINUED | OUTPATIENT
Start: 2024-02-19 | End: 2024-02-19

## 2024-02-19 RX ORDER — LIDOCAINE HYDROCHLORIDE 20 MG/ML
INJECTION, SOLUTION INFILTRATION; PERINEURAL PRN
Status: DISCONTINUED | OUTPATIENT
Start: 2024-02-19 | End: 2024-02-19

## 2024-02-19 RX ADMIN — SODIUM CHLORIDE, SODIUM LACTATE, POTASSIUM CHLORIDE, CALCIUM CHLORIDE: 600; 310; 30; 20 INJECTION, SOLUTION INTRAVENOUS at 11:45

## 2024-02-19 RX ADMIN — FENTANYL CITRATE 25 MCG: 50 INJECTION, SOLUTION INTRAMUSCULAR; INTRAVENOUS at 12:10

## 2024-02-19 RX ADMIN — DEXAMETHASONE SODIUM PHOSPHATE 8 MG: 4 INJECTION, SOLUTION INTRA-ARTICULAR; INTRALESIONAL; INTRAMUSCULAR; INTRAVENOUS; SOFT TISSUE at 12:00

## 2024-02-19 RX ADMIN — FENTANYL CITRATE 50 MCG: 50 INJECTION, SOLUTION INTRAMUSCULAR; INTRAVENOUS at 12:00

## 2024-02-19 RX ADMIN — LIDOCAINE HYDROCHLORIDE 60 MG: 20 INJECTION, SOLUTION INFILTRATION; PERINEURAL at 12:00

## 2024-02-19 RX ADMIN — PROPOFOL 180 MCG/KG/MIN: 10 INJECTION, EMULSION INTRAVENOUS at 12:00

## 2024-02-19 RX ADMIN — CEFAZOLIN SODIUM 2 G: 2 INJECTION, SOLUTION INTRAVENOUS at 11:57

## 2024-02-19 RX ADMIN — ONDANSETRON 4 MG: 2 INJECTION INTRAMUSCULAR; INTRAVENOUS at 12:00

## 2024-02-19 RX ADMIN — ACETAMINOPHEN 975 MG: 325 TABLET ORAL at 11:25

## 2024-02-19 RX ADMIN — FENTANYL CITRATE 25 MCG: 50 INJECTION, SOLUTION INTRAMUSCULAR; INTRAVENOUS at 12:03

## 2024-02-19 RX ADMIN — SODIUM CHLORIDE, SODIUM LACTATE, POTASSIUM CHLORIDE, CALCIUM CHLORIDE: 600; 310; 30; 20 INJECTION, SOLUTION INTRAVENOUS at 11:40

## 2024-02-19 RX ADMIN — EPHEDRINE SULFATE 10 MG: 50 INJECTION, SOLUTION INTRAMUSCULAR; INTRAVENOUS; SUBCUTANEOUS at 12:18

## 2024-02-19 NOTE — ANESTHESIA PREPROCEDURE EVALUATION
Anesthesia Pre-Procedure Evaluation    Patient: Soraida Perkins   MRN: [id number] : 1965        Procedure : Procedure(s):  EXCISION, MASS, TORSO          Past Medical History:   Diagnosis Date    Motion sickness     Other chronic pain       Past Surgical History:   Procedure Laterality Date    [facility] APPENDECTOMY      Description: Appendectomy;  Recorded: 2009;      Allergies   Allergen Reactions    Cefoxitin      Reddness    Meperidine      Reddness    Morphine Unknown     GI upset,dizziness      Propofol      Tender iv vein site      Social History     Tobacco Use    Smoking status: Never    Smokeless tobacco: Never   Substance Use Topics    Alcohol use: Yes     Alcohol/week: 0.0 - 0.8 standard drinks of alcohol     Comment: occ      Wt Readings from Last 1 Encounters:   24 57.2 kg (126 lb)        Anesthesia Evaluation   Pt has had prior anesthetic.     No history of anesthetic complications       ROS/MED HX  ENT/Pulmonary:       Neurologic:       Cardiovascular:       METS/Exercise Tolerance:     Hematologic:       Musculoskeletal:       GI/Hepatic:       Renal/Genitourinary:       Endo:       Psychiatric/Substance Use:       Infectious Disease:       Malignancy:       Other:            Physical Exam    Airway        Mallampati: II    Neck ROM: full     Respiratory Devices and Support         Dental       (+) Minor Abnormalities - some fillings, tiny chips      Cardiovascular   cardiovascular exam normal          Pulmonary   pulmonary exam normal                OUTSIDE LABS:  CBC:   Lab Results   Component Value Date    WBC 3.8 (L) 2019    WBC 4.6 2018    HGB 14.6 2019    HGB 14.5 2018    HCT 43.6 2019    HCT 43.0 2018     2019     2018     BMP:   Lab Results   Component Value Date     10/10/2023     2019    POTASSIUM 4.6 10/10/2023    POTASSIUM 4.9 2019    CHLORIDE 102 10/10/2023    CHLORIDE 105 2019  "   CO2 27 10/10/2023    CO2 31 12/06/2019    BUN 19.9 10/10/2023    BUN 20 12/06/2019    CR 1.02 (H) 10/10/2023    CR 0.87 12/06/2019    GLC 84 10/10/2023    GLC 95 12/06/2019     COAGS: No results found for: \"PTT\", \"INR\", \"FIBR\"  POC: No results found for: \"BGM\", \"HCG\", \"HCGS\"  HEPATIC:   Lab Results   Component Value Date    ALBUMIN 4.0 12/06/2019    PROTTOTAL 6.3 12/06/2019    ALT 16 12/06/2019    AST 21 12/06/2019    ALKPHOS 58 12/06/2019    BILITOTAL 0.4 12/06/2019     OTHER:   Lab Results   Component Value Date    A1C 5.1 10/10/2023    ADRIANNA 9.3 10/10/2023    TSH 2.35 10/10/2023    SED 6 01/30/2024       Anesthesia Plan    ASA Status:  1       Anesthesia Type: MAC.              Consents    Anesthesia Plan(s) and associated risks, benefits, and realistic alternatives discussed. Questions answered and patient/representative(s) expressed understanding.     - Discussed: Risks, Benefits and Alternatives for the PROCEDURE were discussed     - Discussed with:  Patient      - Extended Intubation/Ventilatory Support Discussed: No.      - Patient is DNR/DNI Status: No     Use of blood products discussed: No .     Postoperative Care    Pain management: Multi-modal analgesia.   PONV prophylaxis: Ondansetron (or other 5HT-3), Dexamethasone or Solumedrol     Comments:               Isabell Carpio MD    I have reviewed the pertinent notes and labs in the chart from the past 30 days and (re)examined the patient.  Any updates or changes from those notes are reflected in this note.                  "

## 2024-02-19 NOTE — INTERVAL H&P NOTE
I have reviewed the surgical (or preoperative) H&P that is linked to this encounter, and examined the patient. There are no significant changes  HEART: regular rate and rhythm  LUNGS: normal respiratory effort    Felipe Kilgore MD, FACS  Office: [phone]  [facility]   General and Bariatric Surgery        Clinical Conditions Present on Arrival:  Clinically Significant Risk Factors Present on Admission

## 2024-02-19 NOTE — OP NOTE
[facility]  Operative Note    Pre-operative diagnosis: Lipoma of skin and subcutaneous tissue [D17.30]   Post-operative diagnosis lipoma   Procedure: Procedure(s):  EXCISION, LIPOMA, TORSO   Surgeon: Felipe Kilgore MD   Assistants(s): none   Anesthesia: MAC    Estimated blood loss: Less than 10 ml    Total IV fluids: (See anesthesia record)   Blood transfusion: No transfusion was given during surgery   Total urine output: (See anesthesia record)   Drains: None   Specimens: Lipoma, 1 x 3 cm   Implants: None   Findings: None.   Complications: None.   Condition: Stable       Description of procedure:  The patient was brought to the operating room where after induction of monitored anesthetic care she was positioned in left lateral decubitus position.  After procedural timeout, we began by injecting local anesthetic into the skin overlying the palpable subcutaneous nodule.  Sharp incision was then made and we dissected down to the subcutaneous tissues electrocautery until we encountered the underlying palpable lipoma.  Ino to dissect around this before grasping it and removing it from the underlying subcutaneous tissue.  Total lipoma size was roughly 1 x 3 cm.  We then palpated the operative field for any unresected nodular tissue, before closing it with interrupted 4-0 Monocryl suture.    Felipe Kilgore MD, FACS  [phone]  [facility]  General and Bariatric Surgery

## 2024-02-19 NOTE — ANESTHESIA POSTPROCEDURE EVALUATION
Patient: Soraida Perkins    Procedure: Procedure(s):  EXCISION, LIPOMA, TORSO       Anesthesia Type:  MAC    Note:  Disposition: Outpatient   Postop Pain Control: Uneventful            Sign Out: Well controlled pain   PONV: No   Neuro/Psych: Uneventful            Sign Out: Acceptable/Baseline neuro status   Airway/Respiratory: Uneventful            Sign Out: Acceptable/Baseline resp. status   CV/Hemodynamics: Uneventful            Sign Out: Acceptable CV status; No obvious hypovolemia; No obvious fluid overload   Other NRE: NONE   DID A NON-ROUTINE EVENT OCCUR? No           Last vitals:  Vitals Value Taken Time   BP 98/55 02/19/24 1300   Temp 97.5  F (36.4  C) 02/19/24 1228   Pulse 64 02/19/24 1301   Resp 12 02/19/24 1228   SpO2 98 % 02/19/24 1301   Vitals shown include unfiled device data.    Electronically Signed By: Isabell Carpio MD  February 19, 2024  1:04 PM

## 2024-02-19 NOTE — DISCHARGE INSTRUCTIONS
You have received 975 mg of Acetaminophen (Tylenol) at 11:25am. Please do not take an additional dose of Tylenol until after 5:25pm.     Do not exceed 4,000 mg of acetaminophen during a 24 hour period and keep in mind that acetaminophen can also be found in many over-the-counter cold medications as well as narcotics that may be given for pain.            Adult Discharge Orders & Instructions     For 24 hours after surgery    Get plenty of rest.  A responsible adult must stay with you for at least 24 hours after you leave the hospital.     Do not drive or use heavy equipment.  If you have weakness or tingling, don't drive or use heavy equipment until this feeling goes away.    Do not drink alcohol.    Avoid strenuous or risky activities.  Ask for help when climbing stairs.     You may feel lightheaded.  If so, sit for a few minutes before standing.  Have someone help you get up.     You may have a slight fever. Call the doctor if your fever is over 100 F (37.7 C) (taken under the tongue) or lasts longer than 24 hours.    You may have a dry mouth, a sore throat, muscle aches or trouble sleeping.  These should go away after 24 hours.    Do not make important or legal decisions.          If you have any questions or concerns regarding your procedure, please contact Dr. Kilgore, his office number is [phone].

## 2024-02-19 NOTE — ANESTHESIA CARE TRANSFER NOTE
Patient: Soraida Perkins    Procedure: Procedure(s):  EXCISION, LIPOMA, TORSO       Diagnosis: Lipoma of skin and subcutaneous tissue [D17.30]  Diagnosis Additional Information: No value filed.    Anesthesia Type:   MAC     Note:    Oropharynx: oropharynx clear of all foreign objects  Level of Consciousness: drowsy  Oxygen Supplementation: face mask  Level of Supplemental Oxygen (L/min / FiO2): 8  Independent Airway: airway patency satisfactory and stable  Dentition: dentition unchanged  Vital Signs Stable: post-procedure vital signs reviewed and stable  Report to RN Given: handoff report given  Patient transferred to: Phase II    Handoff Report: Identifed the Patient, Identified the Reponsible Provider, Reviewed the pertinent medical history, Discussed the surgical course, Reviewed Intra-OP anesthesia mangement and issues during anesthesia, Set expectations for post-procedure period and Allowed opportunity for questions and acknowledgement of understanding      Vitals:  Vitals Value Taken Time   BP 84/47 02/19/24 1228   Temp 97.5  F (36.4  C) 02/19/24 1228   Pulse     Resp 12 02/19/24 1228   SpO2 100 % 02/19/24 1228       Electronically Signed By: CHRISTY Galo CRNA  February 19, 2024  12:30 PM

## 2024-03-15 ENCOUNTER — HOSPITAL ENCOUNTER (OUTPATIENT)
Dept: ULTRASOUND IMAGING | Facility: HOSPITAL | Age: 59
Discharge: HOME | End: 2024-03-15
Attending: PHYSICIAN ASSISTANT | Admitting: PHYSICIAN ASSISTANT
Payer: COMMERCIAL

## 2024-03-15 DIAGNOSIS — N83.201 CYST OF RIGHT OVARY: ICD-10-CM

## 2024-03-15 PROCEDURE — 76856 US EXAM PELVIC COMPLETE: CPT

## 2024-03-15 PROCEDURE — 76830 TRANSVAGINAL US NON-OB: CPT

## 2024-03-18 DIAGNOSIS — N83.201 CYST OF RIGHT OVARY: Primary | ICD-10-CM

## 2024-06-14 DIAGNOSIS — Z79.890 HORMONE REPLACEMENT THERAPY: ICD-10-CM

## 2024-06-14 RX ORDER — ESTRADIOL 1 MG/1
1 TABLET ORAL DAILY
Qty: 90 TABLET | Refills: 3 | OUTPATIENT
Start: 2024-06-14

## 2024-08-20 DIAGNOSIS — Z79.890 HORMONE REPLACEMENT THERAPY: ICD-10-CM

## 2024-08-20 RX ORDER — PAROXETINE 20 MG/1
TABLET, FILM COATED ORAL
Qty: 90 TABLET | Refills: 3 | OUTPATIENT
Start: 2024-08-20

## 2024-09-10 ENCOUNTER — PATIENT OUTREACH (OUTPATIENT)
Dept: CARE COORDINATION | Facility: CLINIC | Age: 59
End: 2024-09-10
Payer: COMMERCIAL

## 2024-09-24 ENCOUNTER — PATIENT OUTREACH (OUTPATIENT)
Dept: CARE COORDINATION | Facility: CLINIC | Age: 59
End: 2024-09-24
Payer: COMMERCIAL

## 2024-11-09 DIAGNOSIS — Z79.890 HORMONE REPLACEMENT THERAPY: ICD-10-CM

## 2024-11-09 DIAGNOSIS — F51.01 PRIMARY INSOMNIA: ICD-10-CM

## 2024-11-11 RX ORDER — PROGESTERONE 200 MG/1
CAPSULE ORAL
Qty: 90 CAPSULE | Refills: 2 | Status: SHIPPED | OUTPATIENT
Start: 2024-11-11

## 2024-11-11 RX ORDER — TRAZODONE HYDROCHLORIDE 100 MG/1
100 TABLET ORAL DAILY
Qty: 90 TABLET | Refills: 3 | Status: SHIPPED | OUTPATIENT
Start: 2024-11-11

## 2024-11-11 RX ORDER — ESTRADIOL 1 MG/1
0.5 TABLET ORAL DAILY
Qty: 45 TABLET | Refills: 3 | Status: SHIPPED | OUTPATIENT
Start: 2024-11-11

## 2024-11-11 RX ORDER — PAROXETINE 20 MG/1
TABLET, FILM COATED ORAL
Qty: 90 TABLET | Refills: 3 | Status: SHIPPED | OUTPATIENT
Start: 2024-11-11

## 2024-12-14 ENCOUNTER — HEALTH MAINTENANCE LETTER (OUTPATIENT)
Age: 59
End: 2024-12-14

## 2025-08-28 DIAGNOSIS — Z79.890 HORMONE REPLACEMENT THERAPY: ICD-10-CM

## 2025-08-28 RX ORDER — PROGESTERONE 200 MG/1
200 CAPSULE ORAL AT BEDTIME
Qty: 90 CAPSULE | Refills: 2 | OUTPATIENT
Start: 2025-08-28

## 2025-08-28 RX ORDER — PROGESTERONE 200 MG/1
CAPSULE ORAL
Qty: 90 CAPSULE | Refills: 3 | Status: SHIPPED | OUTPATIENT
Start: 2025-08-28